# Patient Record
Sex: MALE | Race: WHITE | NOT HISPANIC OR LATINO | URBAN - METROPOLITAN AREA
[De-identification: names, ages, dates, MRNs, and addresses within clinical notes are randomized per-mention and may not be internally consistent; named-entity substitution may affect disease eponyms.]

---

## 2020-07-29 PROBLEM — Z00.00 ENCOUNTER FOR PREVENTIVE HEALTH EXAMINATION: Status: ACTIVE | Noted: 2020-07-29

## 2020-08-05 ENCOUNTER — OUTPATIENT (OUTPATIENT)
Dept: OUTPATIENT SERVICES | Facility: HOSPITAL | Age: 55
LOS: 1 days | End: 2020-08-05
Payer: COMMERCIAL

## 2020-08-05 VITALS
TEMPERATURE: 99 F | OXYGEN SATURATION: 99 % | DIASTOLIC BLOOD PRESSURE: 88 MMHG | HEIGHT: 66 IN | RESPIRATION RATE: 20 BRPM | SYSTOLIC BLOOD PRESSURE: 160 MMHG | WEIGHT: 266.1 LBS | HEART RATE: 88 BPM

## 2020-08-05 DIAGNOSIS — D32.1 BENIGN NEOPLASM OF SPINAL MENINGES: ICD-10-CM

## 2020-08-05 DIAGNOSIS — Z29.9 ENCOUNTER FOR PROPHYLACTIC MEASURES, UNSPECIFIED: ICD-10-CM

## 2020-08-05 DIAGNOSIS — Z86.69 PERSONAL HISTORY OF OTHER DISEASES OF THE NERVOUS SYSTEM AND SENSE ORGANS: ICD-10-CM

## 2020-08-05 DIAGNOSIS — Z98.890 OTHER SPECIFIED POSTPROCEDURAL STATES: Chronic | ICD-10-CM

## 2020-08-05 DIAGNOSIS — E11.9 TYPE 2 DIABETES MELLITUS WITHOUT COMPLICATIONS: ICD-10-CM

## 2020-08-05 DIAGNOSIS — K60.4 RECTAL FISTULA: Chronic | ICD-10-CM

## 2020-08-05 DIAGNOSIS — Z90.89 ACQUIRED ABSENCE OF OTHER ORGANS: Chronic | ICD-10-CM

## 2020-08-05 LAB
A1C WITH ESTIMATED AVERAGE GLUCOSE RESULT: 8.2 % — HIGH (ref 4–5.6)
ALBUMIN SERPL ELPH-MCNC: 4.2 G/DL — SIGNIFICANT CHANGE UP (ref 3.3–5)
ALP SERPL-CCNC: 59 U/L — SIGNIFICANT CHANGE UP (ref 40–120)
ALT FLD-CCNC: 14 U/L — SIGNIFICANT CHANGE UP (ref 10–45)
ANION GAP SERPL CALC-SCNC: 17 MMOL/L — SIGNIFICANT CHANGE UP (ref 5–17)
AST SERPL-CCNC: 13 U/L — SIGNIFICANT CHANGE UP (ref 10–40)
BILIRUB SERPL-MCNC: 0.2 MG/DL — SIGNIFICANT CHANGE UP (ref 0.2–1.2)
BLD GP AB SCN SERPL QL: NEGATIVE — SIGNIFICANT CHANGE UP
BUN SERPL-MCNC: 11 MG/DL — SIGNIFICANT CHANGE UP (ref 7–23)
CALCIUM SERPL-MCNC: 9.8 MG/DL — SIGNIFICANT CHANGE UP (ref 8.4–10.5)
CHLORIDE SERPL-SCNC: 104 MMOL/L — SIGNIFICANT CHANGE UP (ref 96–108)
CO2 SERPL-SCNC: 20 MMOL/L — LOW (ref 22–31)
CREAT SERPL-MCNC: 0.59 MG/DL — SIGNIFICANT CHANGE UP (ref 0.5–1.3)
ESTIMATED AVERAGE GLUCOSE: 189 MG/DL — HIGH (ref 68–114)
GLUCOSE SERPL-MCNC: 217 MG/DL — HIGH (ref 70–99)
HCT VFR BLD CALC: 41.8 % — SIGNIFICANT CHANGE UP (ref 39–50)
HGB BLD-MCNC: 14 G/DL — SIGNIFICANT CHANGE UP (ref 13–17)
MCHC RBC-ENTMCNC: 30.4 PG — SIGNIFICANT CHANGE UP (ref 27–34)
MCHC RBC-ENTMCNC: 33.5 GM/DL — SIGNIFICANT CHANGE UP (ref 32–36)
MCV RBC AUTO: 90.9 FL — SIGNIFICANT CHANGE UP (ref 80–100)
NRBC # BLD: 0 /100 WBCS — SIGNIFICANT CHANGE UP (ref 0–0)
PLATELET # BLD AUTO: 220 K/UL — SIGNIFICANT CHANGE UP (ref 150–400)
POTASSIUM SERPL-MCNC: 3.8 MMOL/L — SIGNIFICANT CHANGE UP (ref 3.5–5.3)
POTASSIUM SERPL-SCNC: 3.8 MMOL/L — SIGNIFICANT CHANGE UP (ref 3.5–5.3)
PROT SERPL-MCNC: 6.8 G/DL — SIGNIFICANT CHANGE UP (ref 6–8.3)
RBC # BLD: 4.6 M/UL — SIGNIFICANT CHANGE UP (ref 4.2–5.8)
RBC # FLD: 12.3 % — SIGNIFICANT CHANGE UP (ref 10.3–14.5)
RH IG SCN BLD-IMP: POSITIVE — SIGNIFICANT CHANGE UP
SODIUM SERPL-SCNC: 141 MMOL/L — SIGNIFICANT CHANGE UP (ref 135–145)
WBC # BLD: 9.28 K/UL — SIGNIFICANT CHANGE UP (ref 3.8–10.5)
WBC # FLD AUTO: 9.28 K/UL — SIGNIFICANT CHANGE UP (ref 3.8–10.5)

## 2020-08-05 PROCEDURE — 80053 COMPREHEN METABOLIC PANEL: CPT

## 2020-08-05 PROCEDURE — 87641 MR-STAPH DNA AMP PROBE: CPT

## 2020-08-05 PROCEDURE — 83036 HEMOGLOBIN GLYCOSYLATED A1C: CPT

## 2020-08-05 PROCEDURE — 85027 COMPLETE CBC AUTOMATED: CPT

## 2020-08-05 PROCEDURE — 86900 BLOOD TYPING SEROLOGIC ABO: CPT

## 2020-08-05 PROCEDURE — 87640 STAPH A DNA AMP PROBE: CPT

## 2020-08-05 PROCEDURE — G0463: CPT

## 2020-08-05 PROCEDURE — 86850 RBC ANTIBODY SCREEN: CPT

## 2020-08-05 PROCEDURE — 86901 BLOOD TYPING SEROLOGIC RH(D): CPT

## 2020-08-05 RX ORDER — CEFAZOLIN SODIUM 1 G
2000 VIAL (EA) INJECTION ONCE
Refills: 0 | Status: DISCONTINUED | OUTPATIENT
Start: 2020-08-17 | End: 2020-08-18

## 2020-08-05 NOTE — H&P PST ADULT - NSICDXPROBLEM_GEN_ALL_CORE_FT
PROBLEM DIAGNOSES  Problem: H/O benign neoplasm of spinal meninges  Assessment and Plan: scheduled for T1 laminectomy/ excision of spinal tumor  preop instruction given, patient not to eat or drink anything after 11pm night prior to surgery, patient verbalized understanding  chlorhexidine wash instruction given  medical evaluation report to obtain from pcp office     Problem: T2DM (type 2 diabetes mellitus)  Assessment and Plan: monitor blood sugar   no diabetic medication morning of surgery   last dose of metformin on 8/15/2020 in the evening     Problem: Need for prophylactic measure  Assessment and Plan: The Caprini score indicates this patient is at risk for a VTE event (score 3-5).  Most surgical patients in this group would benefit from pharmacologic prophylaxis.  The surgical team will determine the balance between VTE risk and bleeding risk

## 2020-08-05 NOTE — H&P PST ADULT - ASSESSMENT
CAPRINI SCORE [CLOT updated 18]    AGE RELATED RISK FACTORS                                                       MOBILITY RELATED FACTORS  [1] Age 41-60 years                                            (1 Point)                    [ ] Bed rest                                                        (1 Point)  [ ] Age: 61-74 years                                           (2 Points)                  [ ] Plaster cast                                                   (2 Points)  [ ] Age= 75 years                                              (3 Points)                    [ ] Bed bound for more than 72 hours                 (2 Points)    DISEASE RELATED RISK FACTORS                                               GENDER SPECIFIC FACTORS  [ ] Edema in the lower extremities                       (1 Point)              [ ] Pregnancy                                                     (1 Point)  [ ] Varicose veins                                               (1 Point)                     [ ] Post-partum < 6 weeks                                   (1 Point)             [ 1] BMI > 25 Kg/m2                                            (1 Point)                     [ ] Hormonal therapy  or oral contraception          (1 Point)                 [ ] Sepsis (in the previous month)                        (1 Point)               [ ] History of pregnancy complications                 (1 point)  [ ] Pneumonia or serious lung disease                                               [ ] Unexplained or recurrent                     (1 Point)           (in the previous month)                               (1 Point)  [ ] Abnormal pulmonary function test                     (1 Point)                 SURGERY RELATED RISK FACTORS  [ ] Acute myocardial infarction                              (1 Point)               [ ]  Section                                             (1 Point)  [ ] Congestive heart failure (in the previous month)  (1 Point)      [ ] Minor surgery                                                  (1 Point)   [ ] Inflammatory bowel disease                             (1 Point)               [ ] Arthroscopic surgery                                        (2 Points)  [ ] Central venous access                                      (2 Points)                [ 2] General surgery lasting more than 45 minutes (2 points)  [ ] Present or previous malignancy                     (2 Points)                [ ] Elective arthroplasty                                         (5 points)    [ ] Stroke (in the previous month)                          (5 Points)                                                                                                                                                           HEMATOLOGY RELATED FACTORS                                                 TRAUMA RELATED RISK FACTORS  [ ] Prior episodes of VTE                                     (3 Points)                [ ] Fracture of the hip, pelvis, or leg                       (5 Points)  [ ] Positive family history for VTE                         (3 Points)             [ ] Acute spinal cord injury (in the previous month)  (5 Points)  [ ] Prothrombin 90831 A                                     (3 Points)               [ ] Paralysis  (less than 1 month)                             (5 Points)  [ ] Factor V Leiden                                             (3 Points)                  [ ] Multiple Trauma within 1 month                        (5 Points)  [ ] Lupus anticoagulants                                     (3 Points)                                                           [ ] Anticardiolipin antibodies                               (3 Points)                                                       [ ] High homocysteine in the blood                      (3 Points)                                             [ ] Other congenital or acquired thrombophilia      (3 Points)                                                [ ] Heparin induced thrombocytopenia                  (3 Points)                                     Total Score [ 4 ]

## 2020-08-05 NOTE — H&P PST ADULT - HISTORY OF PRESENT ILLNESS
55 year old male with a benign neoplasm of spinal meninges found incidentally in June 2020, being seen in preadmission testing for scheduled T1 laminectomy, excision of spinal on 8/17/2020. 55 year old male with a benign neoplasm of spinal meninges found incidentally in June 2020 for a posterior neck fatty tissue MRI, being seen in preadmission testing for scheduled T1 laminectomy, excision of spinal on 8/17/2020. Patient medical history includes T2DM, dyslipidemia, obesity, diabetic neuropathy, lumbar spine pain with bilateral sciatica, T10-11 flattening discs, T4-10 fused discs.    ****COVID-19 PCR TEST SCHEDULED FOR 8/14/2020 [facility] 55 year old male with a benign neoplasm of spinal meninges found incidentally in June 2020 during a posterior neck fatty tissue MRI, being seen in preadmission testing for scheduled T1 laminectomy, excision of spinal on 8/17/2020. Patient medical history includes T2DM, dyslipidemia, obesity, diabetic neuropathy, lumbar spine pain with bilateral sciatica, T10-11 flattening discs, T4-10 fused discs.    ****COVID-19 PCR TEST SCHEDULED FOR 8/14/2020 [facility]

## 2020-08-05 NOTE — H&P PST ADULT - NSICDXPASTSURGICALHX_GEN_ALL_CORE_FT
PAST SURGICAL HISTORY:  History of endoscopy h/o upper and lower 2016    History of tonsillectomy PAST SURGICAL HISTORY:  History of endoscopy h/o upper and lower 2016    History of tonsillectomy     Rectal fistula h/o rectal fistula repair

## 2020-08-05 NOTE — H&P PST ADULT - NSICDXPASTMEDICALHX_GEN_ALL_CORE_FT
PAST MEDICAL HISTORY:  Fatty liver last abdominal sono 6/2020    History of diverticulosis     History of Helicobacter infection 2016 treated    History of migraine headaches     IBS (irritable bowel syndrome)     Pain in back with sciatica bilateral    Pinched nerve in neck mostly left side    Snores     T2DM (type 2 diabetes mellitus) dx 2-3 years ago   unknown last A1c PAST MEDICAL HISTORY:  Dyslipidemia     Fatty liver last abdominal sono 6/2020    H/O benign neoplasm of spinal meninges     H/O diabetic neuropathy     History of diverticulosis     History of Helicobacter infection 2016 treated    History of migraine headaches     History of obesity     IBS (irritable bowel syndrome) mostly loose stool    Pain in back with sciatica bilateral  thoracic spine    Pinched nerve in neck mostly left side    Snores     T2DM (type 2 diabetes mellitus) dx 2-3 years ago   unknown last A1c PAST MEDICAL HISTORY:  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

## 2020-08-06 LAB
MRSA PCR RESULT.: SIGNIFICANT CHANGE UP
S AUREUS DNA NOSE QL NAA+PROBE: DETECTED

## 2020-08-14 ENCOUNTER — APPOINTMENT (OUTPATIENT)
Dept: DISASTER EMERGENCY | Facility: CLINIC | Age: 55
End: 2020-08-14

## 2020-08-14 DIAGNOSIS — Z01.818 ENCOUNTER FOR OTHER PREPROCEDURAL EXAMINATION: ICD-10-CM

## 2020-08-15 LAB — SARS-COV-2 N GENE NPH QL NAA+PROBE: NOT DETECTED

## 2020-08-16 ENCOUNTER — TRANSCRIPTION ENCOUNTER (OUTPATIENT)
Age: 55
End: 2020-08-16

## 2020-08-17 ENCOUNTER — RESULT REVIEW (OUTPATIENT)
Age: 55
End: 2020-08-17

## 2020-08-17 ENCOUNTER — INPATIENT (INPATIENT)
Facility: HOSPITAL | Age: 55
LOS: 3 days | Discharge: HOME | DRG: 29 | End: 2020-08-21
Attending: NEUROLOGICAL SURGERY | Admitting: NEUROLOGICAL SURGERY
Payer: COMMERCIAL

## 2020-08-17 VITALS
OXYGEN SATURATION: 97 % | RESPIRATION RATE: 18 BRPM | TEMPERATURE: 98 F | DIASTOLIC BLOOD PRESSURE: 89 MMHG | SYSTOLIC BLOOD PRESSURE: 157 MMHG | WEIGHT: 266.1 LBS | HEART RATE: 89 BPM | HEIGHT: 66 IN

## 2020-08-17 DIAGNOSIS — Z98.890 OTHER SPECIFIED POSTPROCEDURAL STATES: Chronic | ICD-10-CM

## 2020-08-17 DIAGNOSIS — D32.1 BENIGN NEOPLASM OF SPINAL MENINGES: ICD-10-CM

## 2020-08-17 DIAGNOSIS — K60.4 RECTAL FISTULA: Chronic | ICD-10-CM

## 2020-08-17 DIAGNOSIS — Z90.89 ACQUIRED ABSENCE OF OTHER ORGANS: Chronic | ICD-10-CM

## 2020-08-17 LAB
ANION GAP SERPL CALC-SCNC: 15 MMOL/L — SIGNIFICANT CHANGE UP (ref 5–17)
BUN SERPL-MCNC: 12 MG/DL — SIGNIFICANT CHANGE UP (ref 7–23)
CALCIUM SERPL-MCNC: 8.7 MG/DL — SIGNIFICANT CHANGE UP (ref 8.4–10.5)
CHLORIDE SERPL-SCNC: 101 MMOL/L — SIGNIFICANT CHANGE UP (ref 96–108)
CO2 SERPL-SCNC: 21 MMOL/L — LOW (ref 22–31)
CREAT SERPL-MCNC: 0.92 MG/DL — SIGNIFICANT CHANGE UP (ref 0.5–1.3)
GLUCOSE BLDC GLUCOMTR-MCNC: 180 MG/DL — HIGH (ref 70–99)
GLUCOSE BLDC GLUCOMTR-MCNC: 256 MG/DL — HIGH (ref 70–99)
GLUCOSE SERPL-MCNC: 281 MG/DL — HIGH (ref 70–99)
HCT VFR BLD CALC: 39.6 % — SIGNIFICANT CHANGE UP (ref 39–50)
HGB BLD-MCNC: 13.1 G/DL — SIGNIFICANT CHANGE UP (ref 13–17)
MCHC RBC-ENTMCNC: 30.4 PG — SIGNIFICANT CHANGE UP (ref 27–34)
MCHC RBC-ENTMCNC: 33.1 GM/DL — SIGNIFICANT CHANGE UP (ref 32–36)
MCV RBC AUTO: 91.9 FL — SIGNIFICANT CHANGE UP (ref 80–100)
NRBC # BLD: 0 /100 WBCS — SIGNIFICANT CHANGE UP (ref 0–0)
PLATELET # BLD AUTO: 212 K/UL — SIGNIFICANT CHANGE UP (ref 150–400)
POTASSIUM SERPL-MCNC: 4.3 MMOL/L — SIGNIFICANT CHANGE UP (ref 3.5–5.3)
POTASSIUM SERPL-SCNC: 4.3 MMOL/L — SIGNIFICANT CHANGE UP (ref 3.5–5.3)
RBC # BLD: 4.31 M/UL — SIGNIFICANT CHANGE UP (ref 4.2–5.8)
RBC # FLD: 12.8 % — SIGNIFICANT CHANGE UP (ref 10.3–14.5)
RH IG SCN BLD-IMP: POSITIVE — SIGNIFICANT CHANGE UP
SODIUM SERPL-SCNC: 137 MMOL/L — SIGNIFICANT CHANGE UP (ref 135–145)
WBC # BLD: 9.89 K/UL — SIGNIFICANT CHANGE UP (ref 3.8–10.5)
WBC # FLD AUTO: 9.89 K/UL — SIGNIFICANT CHANGE UP (ref 3.8–10.5)

## 2020-08-17 PROCEDURE — 88331 PATH CONSLTJ SURG 1 BLK 1SPC: CPT | Mod: 26

## 2020-08-17 PROCEDURE — 88307 TISSUE EXAM BY PATHOLOGIST: CPT | Mod: 26

## 2020-08-17 PROCEDURE — 88342 IMHCHEM/IMCYTCHM 1ST ANTB: CPT | Mod: 26,59

## 2020-08-17 PROCEDURE — 88305 TISSUE EXAM BY PATHOLOGIST: CPT | Mod: 26

## 2020-08-17 PROCEDURE — 99291 CRITICAL CARE FIRST HOUR: CPT

## 2020-08-17 PROCEDURE — 88360 TUMOR IMMUNOHISTOCHEM/MANUAL: CPT | Mod: 26

## 2020-08-17 PROCEDURE — 88334 PATH CONSLTJ SURG CYTO XM EA: CPT | Mod: 26,59

## 2020-08-17 RX ORDER — DEXAMETHASONE 0.5 MG/5ML
4 ELIXIR ORAL EVERY 8 HOURS
Refills: 0 | Status: DISCONTINUED | OUTPATIENT
Start: 2020-08-17 | End: 2020-08-21

## 2020-08-17 RX ORDER — LABETALOL HCL 100 MG
10 TABLET ORAL ONCE
Refills: 0 | Status: COMPLETED | OUTPATIENT
Start: 2020-08-17 | End: 2020-08-17

## 2020-08-17 RX ORDER — INSULIN LISPRO 100/ML
VIAL (ML) SUBCUTANEOUS
Refills: 0 | Status: DISCONTINUED | OUTPATIENT
Start: 2020-08-17 | End: 2020-08-19

## 2020-08-17 RX ORDER — OXYCODONE HYDROCHLORIDE 5 MG/1
10 TABLET ORAL EVERY 6 HOURS
Refills: 0 | Status: DISCONTINUED | OUTPATIENT
Start: 2020-08-17 | End: 2020-08-21

## 2020-08-17 RX ORDER — SODIUM CHLORIDE 9 MG/ML
1000 INJECTION, SOLUTION INTRAVENOUS
Refills: 0 | Status: DISCONTINUED | OUTPATIENT
Start: 2020-08-17 | End: 2020-08-21

## 2020-08-17 RX ORDER — ACETAMINOPHEN 500 MG
650 TABLET ORAL EVERY 6 HOURS
Refills: 0 | Status: DISCONTINUED | OUTPATIENT
Start: 2020-08-17 | End: 2020-08-21

## 2020-08-17 RX ORDER — LISINOPRIL 2.5 MG/1
5 TABLET ORAL DAILY
Refills: 0 | Status: DISCONTINUED | OUTPATIENT
Start: 2020-08-17 | End: 2020-08-21

## 2020-08-17 RX ORDER — OXYCODONE HYDROCHLORIDE 5 MG/1
5 TABLET ORAL EVERY 6 HOURS
Refills: 0 | Status: DISCONTINUED | OUTPATIENT
Start: 2020-08-17 | End: 2020-08-21

## 2020-08-17 RX ORDER — DEXTROSE 50 % IN WATER 50 %
25 SYRINGE (ML) INTRAVENOUS ONCE
Refills: 0 | Status: DISCONTINUED | OUTPATIENT
Start: 2020-08-17 | End: 2020-08-21

## 2020-08-17 RX ORDER — SENNA PLUS 8.6 MG/1
2 TABLET ORAL AT BEDTIME
Refills: 0 | Status: DISCONTINUED | OUTPATIENT
Start: 2020-08-17 | End: 2020-08-21

## 2020-08-17 RX ORDER — UBIDECARENONE 100 MG
0 CAPSULE ORAL
Qty: 0 | Refills: 0 | DISCHARGE

## 2020-08-17 RX ORDER — DEXTROSE 50 % IN WATER 50 %
12.5 SYRINGE (ML) INTRAVENOUS ONCE
Refills: 0 | Status: DISCONTINUED | OUTPATIENT
Start: 2020-08-17 | End: 2020-08-21

## 2020-08-17 RX ORDER — CHOLECALCIFEROL (VITAMIN D3) 125 MCG
0 CAPSULE ORAL
Qty: 0 | Refills: 0 | DISCHARGE

## 2020-08-17 RX ORDER — CLOTRIMAZOLE 10 MG
0 TROCHE MUCOUS MEMBRANE
Qty: 0 | Refills: 0 | DISCHARGE

## 2020-08-17 RX ORDER — CHLORHEXIDINE GLUCONATE 213 G/1000ML
1 SOLUTION TOPICAL ONCE
Refills: 0 | Status: DISCONTINUED | OUTPATIENT
Start: 2020-08-17 | End: 2020-08-17

## 2020-08-17 RX ORDER — SIMVASTATIN 20 MG/1
5 TABLET, FILM COATED ORAL AT BEDTIME
Refills: 0 | Status: DISCONTINUED | OUTPATIENT
Start: 2020-08-17 | End: 2020-08-21

## 2020-08-17 RX ORDER — SODIUM CHLORIDE 9 MG/ML
1000 INJECTION INTRAMUSCULAR; INTRAVENOUS; SUBCUTANEOUS
Refills: 0 | Status: DISCONTINUED | OUTPATIENT
Start: 2020-08-17 | End: 2020-08-18

## 2020-08-17 RX ORDER — ONDANSETRON 8 MG/1
4 TABLET, FILM COATED ORAL EVERY 6 HOURS
Refills: 0 | Status: DISCONTINUED | OUTPATIENT
Start: 2020-08-17 | End: 2020-08-17

## 2020-08-17 RX ORDER — HYDROMORPHONE HYDROCHLORIDE 2 MG/ML
0.25 INJECTION INTRAMUSCULAR; INTRAVENOUS; SUBCUTANEOUS
Refills: 0 | Status: DISCONTINUED | OUTPATIENT
Start: 2020-08-17 | End: 2020-08-17

## 2020-08-17 RX ORDER — METFORMIN HYDROCHLORIDE 850 MG/1
1 TABLET ORAL
Qty: 0 | Refills: 0 | DISCHARGE

## 2020-08-17 RX ORDER — SIMVASTATIN 20 MG/1
1 TABLET, FILM COATED ORAL
Qty: 0 | Refills: 0 | DISCHARGE

## 2020-08-17 RX ORDER — GLUCAGON INJECTION, SOLUTION 0.5 MG/.1ML
1 INJECTION, SOLUTION SUBCUTANEOUS ONCE
Refills: 0 | Status: DISCONTINUED | OUTPATIENT
Start: 2020-08-17 | End: 2020-08-21

## 2020-08-17 RX ORDER — ZINC SULFATE TAB 220 MG (50 MG ZINC EQUIVALENT) 220 (50 ZN) MG
0 TAB ORAL
Qty: 0 | Refills: 0 | DISCHARGE

## 2020-08-17 RX ORDER — INSULIN LISPRO 100/ML
VIAL (ML) SUBCUTANEOUS
Refills: 0 | Status: DISCONTINUED | OUTPATIENT
Start: 2020-08-17 | End: 2020-08-17

## 2020-08-17 RX ORDER — DEXTROSE 50 % IN WATER 50 %
15 SYRINGE (ML) INTRAVENOUS ONCE
Refills: 0 | Status: DISCONTINUED | OUTPATIENT
Start: 2020-08-17 | End: 2020-08-21

## 2020-08-17 RX ORDER — SODIUM CHLORIDE 9 MG/ML
3 INJECTION INTRAMUSCULAR; INTRAVENOUS; SUBCUTANEOUS EVERY 8 HOURS
Refills: 0 | Status: DISCONTINUED | OUTPATIENT
Start: 2020-08-17 | End: 2020-08-17

## 2020-08-17 RX ORDER — LIDOCAINE HCL 20 MG/ML
0.2 VIAL (ML) INJECTION ONCE
Refills: 0 | Status: DISCONTINUED | OUTPATIENT
Start: 2020-08-17 | End: 2020-08-17

## 2020-08-17 RX ORDER — RAMIPRIL 5 MG
0 CAPSULE ORAL
Qty: 0 | Refills: 0 | DISCHARGE

## 2020-08-17 RX ADMIN — Medication 4 MILLIGRAM(S): at 21:08

## 2020-08-17 RX ADMIN — Medication 3: at 16:09

## 2020-08-17 RX ADMIN — Medication 10 MILLIGRAM(S): at 21:08

## 2020-08-17 RX ADMIN — OXYCODONE HYDROCHLORIDE 10 MILLIGRAM(S): 5 TABLET ORAL at 20:05

## 2020-08-17 RX ADMIN — OXYCODONE HYDROCHLORIDE 10 MILLIGRAM(S): 5 TABLET ORAL at 20:45

## 2020-08-17 RX ADMIN — Medication 10 MILLIGRAM(S): at 15:34

## 2020-08-17 RX ADMIN — Medication 10 MILLIGRAM(S): at 20:39

## 2020-08-17 RX ADMIN — SIMVASTATIN 5 MILLIGRAM(S): 20 TABLET, FILM COATED ORAL at 21:09

## 2020-08-17 NOTE — PROGRESS NOTE ADULT - ASSESSMENT
ASSESSMENT/PLAN: S/P T1 laminectomy for resection of intradural tumor    NEURO:  Q1 neurochecks  HOB >30 degrees  Monitor drain output  Decadron 4Q8 per NSGY  Analgesics prn  Activity: [] mobilize as tolerated [x] Bedrest for now [] PT [] OT [] PMNR    PULM:  Extubated  Keep sats >92%    CV:  SBP goal <160  Continue home antihypertensives (lisinopril)  On statin    RENAL:  Fluids: IVL once tolerating  Cr wnl  Replete lytes prn    GI:  Diet: Dysphagia eval and advance as tolerated  GI prophylaxis [] not indicated [] PPI [] other:  Bowel regimen [] colace [x] senna [] other:    ENDO: H/O DM  Goal euglycemia (-180)  A1c  Continue ISS    HEME/ONC:  VTE prophylaxis: [x] SCDs [] chemoprophylaxis [x] hold chemoprophylaxis due to: fresh post op[] high risk of DVT/PE on admission due to:  EBL 200cc  H/H post op stable    ID:  Perioperative antibiotics (ancef)  COVID neg  MSSA nares postive    MISC:    SOCIAL/FAMILY:  [x] awaiting [] updated at bedside [] family meeting    CODE STATUS:  [x] Full Code [] DNR [] DNI [] Palliative/Comfort Care    DISPOSITION:  [x] ICU [] Stroke Unit [] Floor [] EMU [] RCU [] PCU    [x] Patient is at high risk of neurologic deterioration/death due to: paraplegia, shock, hemorrhage.       Time spent: 40 critical care minutes    Contact: [phone] ASSESSMENT/PLAN: S/P T1 laminectomy for resection of intradural tumor    NEURO:  Q1 neurochecks  HOB >30 degrees  Monitor drain output (HMV)  Decadron 4Q8 per NSGY  MRI in AM  Analgesics prn  F/U pathology  Activity: [] mobilize as tolerated [x] Bedrest for now [] PT [] OT [] PMNR    PULM:  Extubated  Keep sats >92%    CV:  SBP goal <160  Continue home antihypertensives (lisinopril)  On statin    RENAL:  Fluids: IVL once tolerating  Cr wnl  Replete lytes prn    GI:  Diet: Dysphagia eval and advance as tolerated  GI prophylaxis [] not indicated [] PPI [] other:  Bowel regimen [] colace [x] senna [] other:    ENDO: H/O DM  Goal euglycemia (-180)  A1c  Continue ISS    HEME/ONC:  VTE prophylaxis: [x] SCDs [] chemoprophylaxis [x] hold chemoprophylaxis due to: fresh post op[] high risk of DVT/PE on admission due to:  EBL 200cc  H/H post op stable    ID:  Perioperative antibiotics (ancef)  COVID neg  MSSA nares postive    MISC:    SOCIAL/FAMILY:  [x] awaiting [] updated at bedside [] family meeting    CODE STATUS:  [x] Full Code [] DNR [] DNI [] Palliative/Comfort Care    DISPOSITION:  [x] ICU [] Stroke Unit [] Floor [] EMU [] RCU [] PCU    [x] Patient is at high risk of neurologic deterioration/death due to: paraplegia, shock, hemorrhage.       Time spent: 40 critical care minutes    Contact: [phone]

## 2020-08-17 NOTE — CHART NOTE - NSCHARTNOTEFT_GEN_A_CORE
CAPRINI SCORE [CLOT] Score on Admission for     AGE RELATED RISK FACTORS                                                       MOBILITY RELATED FACTORS  [x ] Age 41-60 years                                            (1 Point)                  [ ] Bed rest                                                        (1 Point)  [ ] Age: 61-74 years                                           (2 Points)                 [ ] Plaster cast                                                   (2 Points)  [ ] Age= 75 years                                              (3 Points)                 [ ] Bed bound for more than 72 hours                 (2 Points)    DISEASE RELATED RISK FACTORS                                               GENDER SPECIFIC FACTORS  [ ] Edema in the lower extremities                       (1 Point)                  [ ] Pregnancy                                                     (1 Point)  [ ] Varicose veins                                               (1 Point)                  [ ] Post-partum < 6 weeks                                   (1 Point)             [x ] BMI > 25 Kg/m2                                            (1 Point)                  [ ] Hormonal therapy  or oral contraception          (1 Point)                 [ ] Sepsis (in the previous month)                        (1 Point)                  [ ] History of pregnancy complications                 (1 point)  [ ] Pneumonia or serious lung disease                                               [ ] Unexplained or recurrent                     (1 Point)           (in the previous month)                               (1 Point)  [ ] Abnormal pulmonary function test                     (1 Point)                 SURGERY RELATED RISK FACTORS (include planned surgeries)  [ ] Acute myocardial infarction                              (1 Point)                 [ ]  Section                                             (1 Point)  [ ] Congestive heart failure (in the previous month)  (1 Point)         [ ] Minor surgery                                                  (1 Point)   [ ] Inflammatory bowel disease                             (1 Point)                 [ ] Arthroscopic surgery                                        (2 Points)  [ ] Central venous access                                      (2 Points)                [x ] General surgery lasting more than 45 minutes   (2 Points)       [ ] Stroke (in the previous month)                          (5 Points)               [ ] Elective arthroplasty                                         (5 Points)            [ ] current or past malignancy                              (2 Points)                                                                                                       HEMATOLOGY RELATED FACTORS                                                 TRAUMA RELATED RISK FACTORS  [ ] Prior episodes of VTE                                     (3 Points)                [ ] Fracture of the hip, pelvis, or leg                       (5 Points)  [ ] Positive family history for VTE                         (3 Points)                 [ ] Acute spinal cord injury (in the previous month)  (5 Points)  [ ] Prothrombin 03217 A                                     (3 Points)                 [ ] Paralysis  (less than 1 month)                             (5 Points)  [ ] Factor V Leiden                                             (3 Points)                  [ ] Multiple Trauma within 1 month                        (5 Points)  [ ] Lupus anticoagulants                                     (3 Points)                                                           [ ] Anticardiolipin antibodies                               (3 Points)                                                       [ ] High homocysteine in the blood                      (3 Points)                                             [ ] Other congenital or acquired thrombophilia      (3 Points)                                                [ ] Heparin induced thrombocytopenia                  (3 Points)                                          Total Score [     4     ]    Risk:  Very low 0   Low 1 to 2   Moderate 3 to 4   High =5       VTE Prophylasix Recommednations:  [x ] mechanical pneumatic compression devices                                      [ ] contraindicated: _____________________  [ ] chemo prophylasix                                                                                   [ x] contraindicated __bleeding risk___________________    **** HIGH LIKELIHOOD DVT PRESENT ON ADMISSION  [ ] (please order LE dopplers within 24 hours of admission)

## 2020-08-17 NOTE — BRIEF OPERATIVE NOTE - NSICDXBRIEFPROCEDURE_GEN_ALL_CORE_FT
PROCEDURES:  Laminectomy, lumbar, for tumor 17-Aug-2020 13:56:09  Dino Swartz  Thoracic laminoplasty 17-Aug-2020 13:55:02  Dino Swartz HEADACHE/dizziness

## 2020-08-17 NOTE — PROGRESS NOTE ADULT - SUBJECTIVE AND OBJECTIVE BOX
HPI:  55 year old male with a benign neoplasm of spinal meninges found incidentally in June 2020 during a posterior neck fatty tissue MRI. Scheduled T1 laminectomy, excision of spinal on 8/17/2020. Patient medical history includes T2DM, dyslipidemia, obesity, diabetic neuropathy, lumbar spine pain with bilateral sciatica, T10-11 flattening discs, T4-10 fused discs.      HOSP COURSE:   8/17: T1 laminectomy for resection of intradural tumor      On admission, the patient was:  GCS: 15  Hunt-Arellano:  modified Bush:  ICH score:  NIHSS:    *** HIGH RISK OF DVT PRESENT ON ADMISSION ***    ICU Vital Signs Last 24 Hrs  T(C): 36.2 (17 Aug 2020 14:10), Max: 36.9 (17 Aug 2020 06:19)  T(F): 97.2 (17 Aug 2020 14:10), Max: 98.4 (17 Aug 2020 06:19)  HR: 88 (17 Aug 2020 15:30) (86 - 92)  BP: 163/89 (17 Aug 2020 15:00) (123/81 - 164/96)  BP(mean): 120 (17 Aug 2020 15:00) (97 - 123)  ABP: 166/66 (17 Aug 2020 15:30) (156/72 - 172/86)  ABP(mean): 96 (17 Aug 2020 15:30) (96 - 118)  RR: 18 (17 Aug 2020 15:30) (16 - 19)  SpO2: 98% (17 Aug 2020 15:30) (97% - 100%)      08-17-20 @ 07:01  -  08-17-20 @ 15:49  --------------------------------------------------------  IN: 75 mL / OUT: 355 mL / NET: -280 mL      acetaminophen   Tablet .. 650 milliGRAM(s) Oral every 6 hours PRN  bisacodyl 5 milliGRAM(s) Oral daily PRN  ceFAZolin   IVPB 2000 milliGRAM(s) IV Intermittent once  dexAMETHasone  Injectable 4 milliGRAM(s) IV Push every 8 hours  dextrose 40% Gel 15 Gram(s) Oral once PRN  dextrose 5%. 1000 milliLiter(s) (50 mL/Hr) IV Continuous <Continuous>  dextrose 50% Injectable 12.5 Gram(s) IV Push once  dextrose 50% Injectable 25 Gram(s) IV Push once  dextrose 50% Injectable 25 Gram(s) IV Push once  glucagon  Injectable 1 milliGRAM(s) IntraMuscular once PRN  HYDROmorphone  Injectable 0.25 milliGRAM(s) IV Push every 10 minutes PRN  insulin lispro (HumaLOG) corrective regimen sliding scale   SubCutaneous three times a day before meals  lisinopril 5 milliGRAM(s) Oral daily  ondansetron Injectable 4 milliGRAM(s) IV Push every 6 hours PRN  oxyCODONE    IR 5 milliGRAM(s) Oral every 6 hours PRN  oxyCODONE    IR 10 milliGRAM(s) Oral every 6 hours PRN  senna 2 Tablet(s) Oral at bedtime PRN  simvastatin 5 milliGRAM(s) Oral at bedtime  sodium chloride 0.9%. 1000 milliLiter(s) (75 mL/Hr) IV Continuous <Continuous>                          13.1   9.89  )-----------( 212      ( 17 Aug 2020 15:15 )             39.6       EXAMINATION:  General:  calm  HEENT:  MMM  Neuro: Alert, Awake, oriented x 3, sensation intact, power 5/5 in all extremities.   Cards:  RRR  Respiratory:  no respiratory distress  Abdomen:  soft  Extremities:  no edema  Skin:  warm/dry HPI:  55 year old male with a benign neoplasm of spinal meninges found incidentally in June 2020 during a posterior neck fatty tissue MRI. Scheduled T1 laminectomy, excision of spinal on 8/17/2020. Patient medical history includes T2DM, dyslipidemia, obesity, diabetic neuropathy, lumbar spine pain with bilateral sciatica, T10-11 flattening discs, T4-10 fused discs.      HOSP COURSE:   8/17: T1 laminectomy for resection of intradural tumor      On admission, the patient was:  GCS: 15      *** HIGH RISK OF DVT PRESENT ON ADMISSION ***    ICU Vital Signs Last 24 Hrs  T(C): 36.2 (17 Aug 2020 14:10), Max: 36.9 (17 Aug 2020 06:19)  T(F): 97.2 (17 Aug 2020 14:10), Max: 98.4 (17 Aug 2020 06:19)  HR: 88 (17 Aug 2020 15:30) (86 - 92)  BP: 163/89 (17 Aug 2020 15:00) (123/81 - 164/96)  BP(mean): 120 (17 Aug 2020 15:00) (97 - 123)  ABP: 166/66 (17 Aug 2020 15:30) (156/72 - 172/86)  ABP(mean): 96 (17 Aug 2020 15:30) (96 - 118)  RR: 18 (17 Aug 2020 15:30) (16 - 19)  SpO2: 98% (17 Aug 2020 15:30) (97% - 100%)      08-17-20 @ 07:01  -  08-17-20 @ 15:49  --------------------------------------------------------  IN: 75 mL / OUT: 355 mL / NET: -280 mL      acetaminophen   Tablet .. 650 milliGRAM(s) Oral every 6 hours PRN  bisacodyl 5 milliGRAM(s) Oral daily PRN  ceFAZolin   IVPB 2000 milliGRAM(s) IV Intermittent once  dexAMETHasone  Injectable 4 milliGRAM(s) IV Push every 8 hours  dextrose 40% Gel 15 Gram(s) Oral once PRN  dextrose 5%. 1000 milliLiter(s) (50 mL/Hr) IV Continuous <Continuous>  dextrose 50% Injectable 12.5 Gram(s) IV Push once  dextrose 50% Injectable 25 Gram(s) IV Push once  dextrose 50% Injectable 25 Gram(s) IV Push once  glucagon  Injectable 1 milliGRAM(s) IntraMuscular once PRN  HYDROmorphone  Injectable 0.25 milliGRAM(s) IV Push every 10 minutes PRN  insulin lispro (HumaLOG) corrective regimen sliding scale   SubCutaneous three times a day before meals  lisinopril 5 milliGRAM(s) Oral daily  ondansetron Injectable 4 milliGRAM(s) IV Push every 6 hours PRN  oxyCODONE    IR 5 milliGRAM(s) Oral every 6 hours PRN  oxyCODONE    IR 10 milliGRAM(s) Oral every 6 hours PRN  senna 2 Tablet(s) Oral at bedtime PRN  simvastatin 5 milliGRAM(s) Oral at bedtime  sodium chloride 0.9%. 1000 milliLiter(s) (75 mL/Hr) IV Continuous <Continuous>                          13.1   9.89  )-----------( 212      ( 17 Aug 2020 15:15 )             39.6       EXAMINATION:  General:  calm  HEENT:  MMM  Neuro: Alert, Awake, oriented x 3, sensation intact, power 5/5 in all extremities.   Cards:  RRR  Respiratory:  no respiratory distress  Abdomen:  soft  Extremities:  no edema  Skin:  warm/dry HPI:  55 year old male with a benign neoplasm of spinal meninges found incidentally in June 2020 during a posterior neck fatty tissue MRI. Scheduled T1 laminectomy, excision of spinal on 8/17/2020. Patient medical history includes T2DM, dyslipidemia, obesity, diabetic neuropathy, lumbar spine pain with bilateral sciatica, T10-11 flattening discs, T4-10 fused discs.      HOSP COURSE:   8/17: T1 laminectomy for resection of intradural tumor      On admission, the patient was:  GCS: 15      *** HIGH RISK OF DVT PRESENT ON ADMISSION ***    ICU Vital Signs Last 24 Hrs  T(C): 36.2 (17 Aug 2020 14:10), Max: 36.9 (17 Aug 2020 06:19)  T(F): 97.2 (17 Aug 2020 14:10), Max: 98.4 (17 Aug 2020 06:19)  HR: 88 (17 Aug 2020 15:30) (86 - 92)  BP: 163/89 (17 Aug 2020 15:00) (123/81 - 164/96)  BP(mean): 120 (17 Aug 2020 15:00) (97 - 123)  ABP: 166/66 (17 Aug 2020 15:30) (156/72 - 172/86)  ABP(mean): 96 (17 Aug 2020 15:30) (96 - 118)  RR: 18 (17 Aug 2020 15:30) (16 - 19)  SpO2: 98% (17 Aug 2020 15:30) (97% - 100%)      08-17-20 @ 07:01  -  08-17-20 @ 15:49  --------------------------------------------------------  IN: 75 mL / OUT: 355 mL / NET: -280 mL      acetaminophen   Tablet .. 650 milliGRAM(s) Oral every 6 hours PRN  bisacodyl 5 milliGRAM(s) Oral daily PRN  ceFAZolin   IVPB 2000 milliGRAM(s) IV Intermittent once  dexAMETHasone  Injectable 4 milliGRAM(s) IV Push every 8 hours  dextrose 40% Gel 15 Gram(s) Oral once PRN  dextrose 5%. 1000 milliLiter(s) (50 mL/Hr) IV Continuous <Continuous>  dextrose 50% Injectable 12.5 Gram(s) IV Push once  dextrose 50% Injectable 25 Gram(s) IV Push once  dextrose 50% Injectable 25 Gram(s) IV Push once  glucagon  Injectable 1 milliGRAM(s) IntraMuscular once PRN  HYDROmorphone  Injectable 0.25 milliGRAM(s) IV Push every 10 minutes PRN  insulin lispro (HumaLOG) corrective regimen sliding scale   SubCutaneous three times a day before meals  lisinopril 5 milliGRAM(s) Oral daily  ondansetron Injectable 4 milliGRAM(s) IV Push every 6 hours PRN  oxyCODONE    IR 5 milliGRAM(s) Oral every 6 hours PRN  oxyCODONE    IR 10 milliGRAM(s) Oral every 6 hours PRN  senna 2 Tablet(s) Oral at bedtime PRN  simvastatin 5 milliGRAM(s) Oral at bedtime  sodium chloride 0.9%. 1000 milliLiter(s) (75 mL/Hr) IV Continuous <Continuous>                          13.1   9.89  )-----------( 212      ( 17 Aug 2020 15:15 )             39.6     08-17    137  |  101  |  12  ----------------------------<  281<H>  4.3   |  21<L>  |  0.92    Ca    8.7      17 Aug 2020 15:15          EXAMINATION:  General:  calm  HEENT:  MMM  Neuro: Alert, Awake, oriented x 3, sensation intact, power 5/5 in all extremities.   Cards:  RRR  Respiratory:  no respiratory distress  Abdomen:  soft  Extremities:  no edema  Skin:  warm/dry

## 2020-08-18 LAB
ANION GAP SERPL CALC-SCNC: 9 MMOL/L — SIGNIFICANT CHANGE UP (ref 5–17)
BUN SERPL-MCNC: 8 MG/DL — SIGNIFICANT CHANGE UP (ref 7–23)
CALCIUM SERPL-MCNC: 9 MG/DL — SIGNIFICANT CHANGE UP (ref 8.4–10.5)
CHLORIDE SERPL-SCNC: 108 MMOL/L — SIGNIFICANT CHANGE UP (ref 96–108)
CO2 SERPL-SCNC: 23 MMOL/L — SIGNIFICANT CHANGE UP (ref 22–31)
CREAT SERPL-MCNC: 0.66 MG/DL — SIGNIFICANT CHANGE UP (ref 0.5–1.3)
GLUCOSE BLDC GLUCOMTR-MCNC: 240 MG/DL — HIGH (ref 70–99)
GLUCOSE BLDC GLUCOMTR-MCNC: 240 MG/DL — HIGH (ref 70–99)
GLUCOSE BLDC GLUCOMTR-MCNC: 249 MG/DL — HIGH (ref 70–99)
GLUCOSE BLDC GLUCOMTR-MCNC: 296 MG/DL — HIGH (ref 70–99)
GLUCOSE SERPL-MCNC: 222 MG/DL — HIGH (ref 70–99)
HCT VFR BLD CALC: 37.2 % — LOW (ref 39–50)
HGB BLD-MCNC: 12.5 G/DL — LOW (ref 13–17)
MAGNESIUM SERPL-MCNC: 1.8 MG/DL — SIGNIFICANT CHANGE UP (ref 1.6–2.6)
MCHC RBC-ENTMCNC: 30.5 PG — SIGNIFICANT CHANGE UP (ref 27–34)
MCHC RBC-ENTMCNC: 33.6 GM/DL — SIGNIFICANT CHANGE UP (ref 32–36)
MCV RBC AUTO: 90.7 FL — SIGNIFICANT CHANGE UP (ref 80–100)
NRBC # BLD: 0 /100 WBCS — SIGNIFICANT CHANGE UP (ref 0–0)
PHOSPHATE SERPL-MCNC: 3.2 MG/DL — SIGNIFICANT CHANGE UP (ref 2.5–4.5)
PLATELET # BLD AUTO: 221 K/UL — SIGNIFICANT CHANGE UP (ref 150–400)
POTASSIUM SERPL-MCNC: 3.7 MMOL/L — SIGNIFICANT CHANGE UP (ref 3.5–5.3)
POTASSIUM SERPL-SCNC: 3.7 MMOL/L — SIGNIFICANT CHANGE UP (ref 3.5–5.3)
RBC # BLD: 4.1 M/UL — LOW (ref 4.2–5.8)
RBC # FLD: 13 % — SIGNIFICANT CHANGE UP (ref 10.3–14.5)
SODIUM SERPL-SCNC: 140 MMOL/L — SIGNIFICANT CHANGE UP (ref 135–145)
WBC # BLD: 12.89 K/UL — HIGH (ref 3.8–10.5)
WBC # FLD AUTO: 12.89 K/UL — HIGH (ref 3.8–10.5)

## 2020-08-18 PROCEDURE — 72157 MRI CHEST SPINE W/O & W/DYE: CPT | Mod: 26

## 2020-08-18 RX ORDER — INSULIN LISPRO 100/ML
VIAL (ML) SUBCUTANEOUS AT BEDTIME
Refills: 0 | Status: DISCONTINUED | OUTPATIENT
Start: 2020-08-18 | End: 2020-08-19

## 2020-08-18 RX ORDER — ENOXAPARIN SODIUM 100 MG/ML
40 INJECTION SUBCUTANEOUS
Refills: 0 | Status: DISCONTINUED | OUTPATIENT
Start: 2020-08-18 | End: 2020-08-21

## 2020-08-18 RX ORDER — MAGNESIUM SULFATE 500 MG/ML
1 VIAL (ML) INJECTION ONCE
Refills: 0 | Status: COMPLETED | OUTPATIENT
Start: 2020-08-18 | End: 2020-08-18

## 2020-08-18 RX ORDER — INSULIN LISPRO 100/ML
3 VIAL (ML) SUBCUTANEOUS
Refills: 0 | Status: DISCONTINUED | OUTPATIENT
Start: 2020-08-18 | End: 2020-08-19

## 2020-08-18 RX ORDER — POTASSIUM CHLORIDE 20 MEQ
40 PACKET (EA) ORAL ONCE
Refills: 0 | Status: COMPLETED | OUTPATIENT
Start: 2020-08-18 | End: 2020-08-18

## 2020-08-18 RX ORDER — POLYETHYLENE GLYCOL 3350 17 G/17G
17 POWDER, FOR SOLUTION ORAL
Refills: 0 | Status: DISCONTINUED | OUTPATIENT
Start: 2020-08-18 | End: 2020-08-21

## 2020-08-18 RX ADMIN — OXYCODONE HYDROCHLORIDE 10 MILLIGRAM(S): 5 TABLET ORAL at 14:00

## 2020-08-18 RX ADMIN — Medication 6: at 17:51

## 2020-08-18 RX ADMIN — SODIUM CHLORIDE 75 MILLILITER(S): 9 INJECTION INTRAMUSCULAR; INTRAVENOUS; SUBCUTANEOUS at 05:28

## 2020-08-18 RX ADMIN — Medication 4: at 09:28

## 2020-08-18 RX ADMIN — Medication 100 GRAM(S): at 01:05

## 2020-08-18 RX ADMIN — Medication 3 UNIT(S): at 17:52

## 2020-08-18 RX ADMIN — Medication 650 MILLIGRAM(S): at 12:21

## 2020-08-18 RX ADMIN — Medication 4 MILLIGRAM(S): at 21:10

## 2020-08-18 RX ADMIN — ENOXAPARIN SODIUM 40 MILLIGRAM(S): 100 INJECTION SUBCUTANEOUS at 17:52

## 2020-08-18 RX ADMIN — Medication 4 MILLIGRAM(S): at 05:19

## 2020-08-18 RX ADMIN — Medication 4: at 13:54

## 2020-08-18 RX ADMIN — Medication 4 MILLIGRAM(S): at 13:59

## 2020-08-18 RX ADMIN — OXYCODONE HYDROCHLORIDE 10 MILLIGRAM(S): 5 TABLET ORAL at 04:39

## 2020-08-18 RX ADMIN — OXYCODONE HYDROCHLORIDE 10 MILLIGRAM(S): 5 TABLET ORAL at 21:40

## 2020-08-18 RX ADMIN — OXYCODONE HYDROCHLORIDE 10 MILLIGRAM(S): 5 TABLET ORAL at 15:00

## 2020-08-18 RX ADMIN — SIMVASTATIN 5 MILLIGRAM(S): 20 TABLET, FILM COATED ORAL at 21:10

## 2020-08-18 RX ADMIN — OXYCODONE HYDROCHLORIDE 10 MILLIGRAM(S): 5 TABLET ORAL at 05:30

## 2020-08-18 RX ADMIN — Medication 650 MILLIGRAM(S): at 13:00

## 2020-08-18 RX ADMIN — OXYCODONE HYDROCHLORIDE 10 MILLIGRAM(S): 5 TABLET ORAL at 21:10

## 2020-08-18 RX ADMIN — Medication 40 MILLIEQUIVALENT(S): at 01:06

## 2020-08-18 RX ADMIN — LISINOPRIL 5 MILLIGRAM(S): 2.5 TABLET ORAL at 05:19

## 2020-08-18 NOTE — PHYSICAL THERAPY INITIAL EVALUATION ADULT - PERTINENT HX OF CURRENT PROBLEM, REHAB EVAL
Pt is a 55 year old male with a benign neoplasm of spinal meninges found incidentally in June 2020 during a posterior neck fatty tissue MRI. Patient medical history includes T2DM, dyslipidemia, obesity, diabetic neuropathy, lumbar spine pain with bilateral sciatica, T10-11 flattening discs, T4-10 fused discs. Pt is s/p T1 laminectomy, excision of spinal tumor.
56 y/o M with PMH of T2DM, dyslipidemia, obesity, diabetic neuropathy, lumbar spine pain with b/l sciatica, T10-11 flattening discs, T4-T10 fused discs, found to have a benign neoplasm of spinal meninges found incidentally in 6/2020. Pt now presents s/p T1 laminoplasty for meningioma.

## 2020-08-18 NOTE — PHYSICAL THERAPY INITIAL EVALUATION ADULT - PLANNED THERAPY INTERVENTIONS, PT EVAL
gait training/strengthening/balance training/GOAL: Pt will negotiate 1 flight of steps independently in 2 weeks./transfer training/bed mobility training

## 2020-08-18 NOTE — OCCUPATIONAL THERAPY INITIAL EVALUATION ADULT - ANTICIPATED DISCHARGE DISPOSITION, OT EVAL
no needs/Patient is functionally independent, no skilled OT intervention is needed. Supervision/Assist for all ADLs and functional mobility. No OT needs once discharged. Pt will require supervision/assist for all ADLs and functional mobility at home./no needs

## 2020-08-18 NOTE — PROGRESS NOTE ADULT - SUBJECTIVE AND OBJECTIVE BOX
Post-op day 3 1 S/P T1 Laminoplasty, resection of intradural tumor    OVERNIGHT EVENTS: no acute event    Vital Signs Last 24 Hrs  T(C): 36.9 (18 Aug 2020 07:08), Max: 37.8 (17 Aug 2020 20:00)  T(F): 98.4 (18 Aug 2020 07:08), Max: 100 (17 Aug 2020 20:00)  HR: 86 (18 Aug 2020 07:08) (81 - 100)  BP: 149/89 (18 Aug 2020 07:08) (123/81 - 164/96)  BP(mean): 85 (18 Aug 2020 06:00) (85 - 123)  RR: 18 (18 Aug 2020 07:08) (16 - 34)  SpO2: 95% (18 Aug 2020 07:08) (95% - 100%)    I&O's Detail    17 Aug 2020 07:01  -  18 Aug 2020 07:00  --------------------------------------------------------  IN:    IV PiggyBack: 100 mL    sodium chloride 0.9%: 1050 mL  Total IN: 1150 mL    OUT:    Accordian: 85 mL    Indwelling Catheter - Urethral: 3190 mL  Total OUT: 3275 mL    Total NET: -2125 mL        I&O's Summary    17 Aug 2020 07:01  -  18 Aug 2020 07:00  --------------------------------------------------------  IN: 1150 mL / OUT: 3275 mL / NET: -2125 mL        PHYSICAL EXAM:  Neurological:    Motor exam:         [x] Upper extremity                 D             B          T          WE       WF      HI                                               R         5/5        5/5        5/5       5/5     5/5       5/5                                               L          5/5        5/5        5/5       5/5     5/5       5/5         [x] Lower extremity                Ps          Ha        Quad    EHL        FHL                                               R        5/5        5/5        5/5       5/5         5/5                                               L         5/5        5/5       5/5       5/5          5/5                                   Sensation: [x] intact to light touch  [] decreased:        Gait    Cardiovascular: Regular  Respiratory: Clear bilaterally  Gastrointestinal: Abd soft + BS non tender  Genitourinary:  Extremities: -C/C/E  Incision/Wound: Intact    TUBES/LINES:  [] CVC  [] A-line  [] Lumbar Drain  [] Ventriculostomy  [] Other    DIET: Consistent carbohydrates Diet  [] NPO  [] Mechanical  [] Tube feeds    LABS:                        12.5   12.89 )-----------( 221      ( 18 Aug 2020 00:19 )             37.2     08-18    140  |  108  |  8   ----------------------------<  222<H>  3.7   |  23  |  0.66    Ca    9.0      18 Aug 2020 00:19  Phos  3.2     08-18  Mg     1.8     08-18              CAPILLARY BLOOD GLUCOSE      POCT Blood Glucose.: 249 mg/dL (18 Aug 2020 08:44)  POCT Blood Glucose.: 256 mg/dL (17 Aug 2020 16:07)    No Known Allergies    Intolerances      MEDICATIONS:  Antibiotics:    Neuro:  acetaminophen   Tablet .. 650 milliGRAM(s) Oral every 6 hours PRN  oxyCODONE    IR 5 milliGRAM(s) Oral every 6 hours PRN  oxyCODONE    IR 10 milliGRAM(s) Oral every 6 hours PRN    Anticoagulation:    OTHER:  bisacodyl 5 milliGRAM(s) Oral daily PRN  dexAMETHasone  Injectable 4 milliGRAM(s) IV Push every 8 hours  dextrose 40% Gel 15 Gram(s) Oral once PRN  dextrose 50% Injectable 12.5 Gram(s) IV Push once  dextrose 50% Injectable 25 Gram(s) IV Push once  dextrose 50% Injectable 25 Gram(s) IV Push once  glucagon  Injectable 1 milliGRAM(s) IntraMuscular once PRN  insulin lispro (HumaLOG) corrective regimen sliding scale   SubCutaneous three times a day before meals  lisinopril 5 milliGRAM(s) Oral daily  polyethylene glycol 3350 17 Gram(s) Oral two times a day  senna 2 Tablet(s) Oral at bedtime PRN  simvastatin 5 milliGRAM(s) Oral at bedtime    IVF:  dextrose 5%. 1000 milliLiter(s) IV Continuous <Continuous>    CULTURES:    RADIOLOGY & ADDITIONAL TESTS:      ASSESSMENT:     55 year old male with a benign neoplasm of spinal meninges found incidentally in June 2020 during a posterior neck fatty tissue MRI, being seen in preadmission testing for scheduled T1 laminectomy, excision of spinal on 8/17/2020. Patient medical history includes T2DM, dyslipidemia, obesity, diabetic neuropathy, lumbar spine pain with bilateral sciatica, T10-11 flattening discs, T4-10 fused discs.    55y Male Post-op day # 1 S/P T1 Laminoplasty, resection of intradural tumor  Please Check When Present   []  GCS  E   V  M     Heart Failure: []Acute, [] acute on chronic , []chronic  Heart Failure:  [] Diastolic (HFpEF), [] Systolic (HFrEF), []Combined (HFpEF and HFrEF), [] RHF, [] Pulm HTN, [] Other    [] NAWAF, [] ATN, [] AIN, [] other  [] CKD1, [] CKD2, [] CKD 3, [] CKD 4, [] CKD 5, []ESRD    Encephalopathy: [] Metabolic, [] Hepatic, [] toxic, [] Neurological, [] Other    Abnormal Nurtitional Status: [] malnurtition (see nutrition note), [ ]underweight: BMI < 19, [] morbid obesity: BMI >40, [] Cachexia    [] Sepsis  [] hypovolemic shock,[] cardiogenic shock, [] hemorrhagic shock, [] neuogenic shock  [] Acute Respiratory Failure  []Cerebral edema, [] Brain compression/ herniation,   [] Functional quadriplegia  [] Acute blood loss anemia      PLAN:  NEURO: stable, follow up MRI today pend, OOB, increase activity as tolerated  CARDIOVASCULAR: Hemodynamically stable  PULMONARY: No issue  RENAL: Henriquez  d/c early today , will void Ck later  GI: Tolerating CCD diet  HEME: H/H stable  ID: Afebrile  ENDO: blood sugar elevated, will add pre-meal Humolog  to sliding scale    DVT PROPHYLAXIS:  [x] Venodynes                                [x] Heparin/Lovenox    FALL RISK:  [x] Low Risk                                    [] Impulsive Post-op day 3 1 S/P T1 Laminoplasty, resection of intradural tumor    OVERNIGHT EVENTS: no acute event    Vital Signs Last 24 Hrs  T(C): 36.9 (18 Aug 2020 07:08), Max: 37.8 (17 Aug 2020 20:00)  T(F): 98.4 (18 Aug 2020 07:08), Max: 100 (17 Aug 2020 20:00)  HR: 86 (18 Aug 2020 07:08) (81 - 100)  BP: 149/89 (18 Aug 2020 07:08) (123/81 - 164/96)  BP(mean): 85 (18 Aug 2020 06:00) (85 - 123)  RR: 18 (18 Aug 2020 07:08) (16 - 34)  SpO2: 95% (18 Aug 2020 07:08) (95% - 100%)    I&O's Detail    17 Aug 2020 07:01  -  18 Aug 2020 07:00  --------------------------------------------------------  IN:    IV PiggyBack: 100 mL    sodium chloride 0.9%: 1050 mL  Total IN: 1150 mL    OUT:    Accordian: 85 mL    Indwelling Catheter - Urethral: 3190 mL  Total OUT: 3275 mL    Total NET: -2125 mL        I&O's Summary    17 Aug 2020 07:01  -  18 Aug 2020 07:00  --------------------------------------------------------  IN: 1150 mL / OUT: 3275 mL / NET: -2125 mL        PHYSICAL EXAM:  Neurological:    Motor exam:         [x] Upper extremity                 D             B          T          WE       WF      HI                                               R         5/5        5/5        5/5       5/5     5/5       5/5                                               L          5/5        5/5        5/5       5/5     5/5       5/5         [x] Lower extremity                Ps          Ha        Quad    EHL        FHL                                               R        5/5        5/5        5/5       5/5         5/5                                               L         5/5        5/5       5/5       5/5          5/5                                   Sensation: [x] intact to light touch  [] decreased:        Gait    Cardiovascular: Regular  Respiratory: Clear bilaterally  Gastrointestinal: Abd soft + BS non tender  Genitourinary:  Extremities: -C/C/E  Incision/Wound: Intact, HMV 85CC/24 hrs left in place    TUBES/LINES:  [] CVC  [] A-line  [] Lumbar Drain  [] Ventriculostomy  [] Other    DIET: Consistent carbohydrates Diet  [] NPO  [] Mechanical  [] Tube feeds    LABS:                        12.5   12.89 )-----------( 221      ( 18 Aug 2020 00:19 )             37.2     08-18    140  |  108  |  8   ----------------------------<  222<H>  3.7   |  23  |  0.66    Ca    9.0      18 Aug 2020 00:19  Phos  3.2     08-18  Mg     1.8     08-18              CAPILLARY BLOOD GLUCOSE      POCT Blood Glucose.: 249 mg/dL (18 Aug 2020 08:44)  POCT Blood Glucose.: 256 mg/dL (17 Aug 2020 16:07)    No Known Allergies    Intolerances      MEDICATIONS:  Antibiotics:    Neuro:  acetaminophen   Tablet .. 650 milliGRAM(s) Oral every 6 hours PRN  oxyCODONE    IR 5 milliGRAM(s) Oral every 6 hours PRN  oxyCODONE    IR 10 milliGRAM(s) Oral every 6 hours PRN    Anticoagulation:    OTHER:  bisacodyl 5 milliGRAM(s) Oral daily PRN  dexAMETHasone  Injectable 4 milliGRAM(s) IV Push every 8 hours  dextrose 40% Gel 15 Gram(s) Oral once PRN  dextrose 50% Injectable 12.5 Gram(s) IV Push once  dextrose 50% Injectable 25 Gram(s) IV Push once  dextrose 50% Injectable 25 Gram(s) IV Push once  glucagon  Injectable 1 milliGRAM(s) IntraMuscular once PRN  insulin lispro (HumaLOG) corrective regimen sliding scale   SubCutaneous three times a day before meals  lisinopril 5 milliGRAM(s) Oral daily  polyethylene glycol 3350 17 Gram(s) Oral two times a day  senna 2 Tablet(s) Oral at bedtime PRN  simvastatin 5 milliGRAM(s) Oral at bedtime    IVF:  dextrose 5%. 1000 milliLiter(s) IV Continuous <Continuous>    CULTURES:    RADIOLOGY & ADDITIONAL TESTS:      ASSESSMENT:     55 year old male with a benign neoplasm of spinal meninges found incidentally in June 2020 during a posterior neck fatty tissue MRI, being seen in preadmission testing for scheduled T1 laminectomy, excision of spinal on 8/17/2020. Patient medical history includes T2DM, dyslipidemia, obesity, diabetic neuropathy, lumbar spine pain with bilateral sciatica, T10-11 flattening discs, T4-10 fused discs.    55y Male Post-op day # 1 S/P T1 Laminoplasty, resection of intradural tumor  Please Check When Present   []  GCS  E   V  M     Heart Failure: []Acute, [] acute on chronic , []chronic  Heart Failure:  [] Diastolic (HFpEF), [] Systolic (HFrEF), []Combined (HFpEF and HFrEF), [] RHF, [] Pulm HTN, [] Other    [] NAWAF, [] ATN, [] AIN, [] other  [] CKD1, [] CKD2, [] CKD 3, [] CKD 4, [] CKD 5, []ESRD    Encephalopathy: [] Metabolic, [] Hepatic, [] toxic, [] Neurological, [] Other    Abnormal Nurtitional Status: [] malnurtition (see nutrition note), [ ]underweight: BMI < 19, [] morbid obesity: BMI >40, [] Cachexia    [] Sepsis  [] hypovolemic shock,[] cardiogenic shock, [] hemorrhagic shock, [] neuogenic shock  [] Acute Respiratory Failure  []Cerebral edema, [] Brain compression/ herniation,   [] Functional quadriplegia  [] Acute blood loss anemia      PLAN:  NEURO: stable, follow up MRI today pend, OOB, increase activity as tolerated  CARDIOVASCULAR: Hemodynamically stable  PULMONARY: No issue  RENAL: Henriquez  d/c early today , will void Ck later  GI: Tolerating CCD diet  HEME: H/H stable  ID: Afebrile  ENDO: blood sugar elevated, will add pre-meal Humolog  to sliding scale    DVT PROPHYLAXIS:  [x] Venodynes                                [x] Heparin/Lovenox    FALL RISK:  [x] Low Risk                                    [] Impulsive

## 2020-08-18 NOTE — OCCUPATIONAL THERAPY INITIAL EVALUATION ADULT - TRANSFER TRAINING, PT EVAL
GOAL: Pt will perform tub transfer Independently in 2 weeks: GOAL: pt will perform toilet transfer Independently in 2 weeks

## 2020-08-18 NOTE — OCCUPATIONAL THERAPY INITIAL EVALUATION ADULT - DIAGNOSIS, OT EVAL
Pt demonstrates decreased strength, balance, ROM impacting functional ambulation and participation in ADLs

## 2020-08-18 NOTE — PHYSICAL THERAPY INITIAL EVALUATION ADULT - ADDITIONAL COMMENTS
Statement Selected
PTA pt was independent with functional mobility and ADLs. Pt lives in a PH with his wife, 3 steps to enter, 1 flight inside.

## 2020-08-18 NOTE — OCCUPATIONAL THERAPY INITIAL EVALUATION ADULT - LIVES WITH, PROFILE
children/Pt lives in private house with wife and son, 3 JERMAINE and 1 flight inside.  Pt independent with all ADLs and functional mobility PTA. Pt does not own any DME./spouse

## 2020-08-18 NOTE — OCCUPATIONAL THERAPY INITIAL EVALUATION ADULT - PERTINENT HX OF CURRENT PROBLEM, REHAB EVAL
55 year old male with a benign neoplasm of spinal meninges found incidentally in June 2020 during a posterior neck fatty tissue MRI. Scheduled T1 laminectomy, excision of spinal on 8/17/2020. Patient medical history includes T2DM, dyslipidemia, obesity, diabetic neuropathy, lumbar spine pain with bilateral sciatica, T10-11 flattening discs, T4-10 fused discs.

## 2020-08-19 LAB
GLUCOSE BLDC GLUCOMTR-MCNC: 226 MG/DL — HIGH (ref 70–99)
GLUCOSE BLDC GLUCOMTR-MCNC: 242 MG/DL — HIGH (ref 70–99)
GLUCOSE BLDC GLUCOMTR-MCNC: 245 MG/DL — HIGH (ref 70–99)
GLUCOSE BLDC GLUCOMTR-MCNC: 261 MG/DL — HIGH (ref 70–99)

## 2020-08-19 RX ORDER — INSULIN LISPRO 100/ML
VIAL (ML) SUBCUTANEOUS
Refills: 0 | Status: DISCONTINUED | OUTPATIENT
Start: 2020-08-19 | End: 2020-08-19

## 2020-08-19 RX ORDER — INSULIN LISPRO 100/ML
6 VIAL (ML) SUBCUTANEOUS
Refills: 0 | Status: DISCONTINUED | OUTPATIENT
Start: 2020-08-19 | End: 2020-08-20

## 2020-08-19 RX ORDER — INSULIN LISPRO 100/ML
VIAL (ML) SUBCUTANEOUS
Refills: 0 | Status: DISCONTINUED | OUTPATIENT
Start: 2020-08-19 | End: 2020-08-21

## 2020-08-19 RX ADMIN — Medication 6 UNIT(S): at 13:10

## 2020-08-19 RX ADMIN — Medication 4: at 13:10

## 2020-08-19 RX ADMIN — OXYCODONE HYDROCHLORIDE 10 MILLIGRAM(S): 5 TABLET ORAL at 12:31

## 2020-08-19 RX ADMIN — Medication 4 MILLIGRAM(S): at 13:08

## 2020-08-19 RX ADMIN — Medication 6 UNIT(S): at 18:39

## 2020-08-19 RX ADMIN — SIMVASTATIN 5 MILLIGRAM(S): 20 TABLET, FILM COATED ORAL at 21:22

## 2020-08-19 RX ADMIN — Medication 4 MILLIGRAM(S): at 21:22

## 2020-08-19 RX ADMIN — Medication 4 MILLIGRAM(S): at 06:32

## 2020-08-19 RX ADMIN — POLYETHYLENE GLYCOL 3350 17 GRAM(S): 17 POWDER, FOR SOLUTION ORAL at 18:33

## 2020-08-19 RX ADMIN — OXYCODONE HYDROCHLORIDE 10 MILLIGRAM(S): 5 TABLET ORAL at 07:02

## 2020-08-19 RX ADMIN — Medication 4: at 18:40

## 2020-08-19 RX ADMIN — Medication 6: at 21:25

## 2020-08-19 RX ADMIN — OXYCODONE HYDROCHLORIDE 10 MILLIGRAM(S): 5 TABLET ORAL at 06:32

## 2020-08-19 RX ADMIN — OXYCODONE HYDROCHLORIDE 10 MILLIGRAM(S): 5 TABLET ORAL at 18:50

## 2020-08-19 RX ADMIN — Medication 650 MILLIGRAM(S): at 13:00

## 2020-08-19 RX ADMIN — OXYCODONE HYDROCHLORIDE 10 MILLIGRAM(S): 5 TABLET ORAL at 13:00

## 2020-08-19 RX ADMIN — Medication 650 MILLIGRAM(S): at 12:31

## 2020-08-19 RX ADMIN — ENOXAPARIN SODIUM 40 MILLIGRAM(S): 100 INJECTION SUBCUTANEOUS at 18:34

## 2020-08-19 RX ADMIN — LISINOPRIL 5 MILLIGRAM(S): 2.5 TABLET ORAL at 06:32

## 2020-08-19 RX ADMIN — Medication 4: at 08:36

## 2020-08-19 RX ADMIN — SODIUM CHLORIDE 3 MILLILITER(S): 9 INJECTION INTRAMUSCULAR; INTRAVENOUS; SUBCUTANEOUS at 17:53

## 2020-08-19 RX ADMIN — OXYCODONE HYDROCHLORIDE 10 MILLIGRAM(S): 5 TABLET ORAL at 18:32

## 2020-08-19 RX ADMIN — POLYETHYLENE GLYCOL 3350 17 GRAM(S): 17 POWDER, FOR SOLUTION ORAL at 06:31

## 2020-08-19 RX ADMIN — Medication 3 UNIT(S): at 08:37

## 2020-08-19 NOTE — PROGRESS NOTE ADULT - SUBJECTIVE AND OBJECTIVE BOX
Post-op day # 2 S/P T1 Laminoplasty, resection of intradural tumor    OVERNIGHT EVENTS: no acute event, c/o numbness around Lt big toe, around his upper abd and Rt thigh. Patient has been ambulating around comfortably    Vital Signs Last 24 Hrs  T(C): 36.8 (19 Aug 2020 08:00), Max: 36.9 (18 Aug 2020 21:55)  T(F): 98.2 (19 Aug 2020 08:00), Max: 98.5 (18 Aug 2020 21:55)  HR: 79 (19 Aug 2020 08:00) (79 - 91)  BP: 140/90 (19 Aug 2020 08:00) (110/67 - 160/75)  BP(mean): --  RR: 18 (19 Aug 2020 08:00) (18 - 18)  SpO2: 95% (19 Aug 2020 08:00) (95% - 96%)    I&O's Detail    18 Aug 2020 07:01  -  19 Aug 2020 07:00  --------------------------------------------------------  IN:    Oral Fluid: 920 mL  Total IN: 920 mL    OUT:    Accordian: 105 mL    Voided: 3050 mL  Total OUT: 3155 mL    Total NET: -2235 mL        I&O's Summary    18 Aug 2020 07:01  -  19 Aug 2020 07:00  --------------------------------------------------------  IN: 920 mL / OUT: 3155 mL / NET: -2235 mL        PHYSICAL EXAM:  Neurological:    Motor exam:         [x] Upper extremity                 D             B          T          WE       WF      HI                                               R         5/5        5/5        5/5       5/5     5/5       5/5                                               L          5/5        5/5        5/5       5/5     5/5       5/5         [x] Lower extremity                Ps          Ha        Quad    EHL        FHL                                               R        5/5        5/5        5/5       5/5         5/5                                               L         5/5        5/5       5/5       5/5          5/5                                   Sensation: [x] intact to light touch  [] decreased:        Gait : intact    Cardiovascular: Regular  Respiratory: Clear bilaterally  Gastrointestinal: Abd soft + BS non tender  Genitourinary:  Extremities: -C/C/E  Incision/Wound: Intact, HMV left in place 105 cc/24 hrs    TUBES/LINES:  [] CVC  [] A-line  [] Lumbar Drain  [] Ventriculostomy  [] Other    DIET:  [] NPO  [] Mechanical  [] Tube feeds    LABS:                        12.5   12.89 )-----------( 221      ( 18 Aug 2020 00:19 )             37.2     08-18    140  |  108  |  8   ----------------------------<  222<H>  3.7   |  23  |  0.66    Ca    9.0      18 Aug 2020 00:19  Phos  3.2     08-18  Mg     1.8     08-18              CAPILLARY BLOOD GLUCOSE      POCT Blood Glucose.: 226 mg/dL (19 Aug 2020 08:29)  POCT Blood Glucose.: 240 mg/dL (18 Aug 2020 21:13)  POCT Blood Glucose.: 296 mg/dL (18 Aug 2020 17:04)  POCT Blood Glucose.: 240 mg/dL (18 Aug 2020 13:42)          Drug Levels: [] N/A    CSF Analysis: [] N/A      Allergies    No Known Allergies    Intolerances      MEDICATIONS:  Antibiotics:    Neuro:  acetaminophen   Tablet .. 650 milliGRAM(s) Oral every 6 hours PRN  oxyCODONE    IR 5 milliGRAM(s) Oral every 6 hours PRN  oxyCODONE    IR 10 milliGRAM(s) Oral every 6 hours PRN    Anticoagulation:  enoxaparin Injectable 40 milliGRAM(s) SubCutaneous <User Schedule>    OTHER:  bisacodyl 5 milliGRAM(s) Oral daily PRN  dexAMETHasone  Injectable 4 milliGRAM(s) IV Push every 8 hours  dextrose 40% Gel 15 Gram(s) Oral once PRN  dextrose 50% Injectable 12.5 Gram(s) IV Push once  dextrose 50% Injectable 25 Gram(s) IV Push once  dextrose 50% Injectable 25 Gram(s) IV Push once  glucagon  Injectable 1 milliGRAM(s) IntraMuscular once PRN  insulin lispro (HumaLOG) corrective regimen sliding scale   SubCutaneous Before meals and at bedtime  insulin lispro Injectable (HumaLOG) 6 Unit(s) SubCutaneous three times a day before meals  lisinopril 5 milliGRAM(s) Oral daily  polyethylene glycol 3350 17 Gram(s) Oral two times a day  senna 2 Tablet(s) Oral at bedtime PRN  simvastatin 5 milliGRAM(s) Oral at bedtime    IVF:  dextrose 5%. 1000 milliLiter(s) IV Continuous <Continuous>    CULTURES:    RADIOLOGY & ADDITIONAL TESTS:        ASSESSMENT:     55 year old male with a benign neoplasm of spinal meninges found incidentally in June 2020 during a posterior neck fatty tissue MRI, being seen in preadmission testing for scheduled T1 laminectomy, excision of spinal on 8/17/2020. Patient medical history includes T2DM, dyslipidemia, obesity, diabetic neuropathy, lumbar spine pain with bilateral sciatica, T10-11 flattening discs, T4-10 fused discs.    55y Male Post-op day # 2 S/P T1 Laminoplasty, resection of intradural tumor  Please Check When Present   []  GCS  E   V  M     Heart Failure: []Acute, [] acute on chronic , []chronic  Heart Failure:  [] Diastolic (HFpEF), [] Systolic (HFrEF), []Combined (HFpEF and HFrEF), [] RHF, [] Pulm HTN, [] Other    [] NAWAF, [] ATN, [] AIN, [] other  [] CKD1, [] CKD2, [] CKD 3, [] CKD 4, [] CKD 5, []ESRD    Encephalopathy: [] Metabolic, [] Hepatic, [] toxic, [] Neurological, [] Other    Abnormal Nurtitional Status: [] malnurtition (see nutrition note), [ ]underweight: BMI < 19, [] morbid obesity: BMI >40, [] Cachexia    [] Sepsis  [] hypovolemic shock,[] cardiogenic shock, [] hemorrhagic shock, [] neuogenic shock  [] Acute Respiratory Failure  []Cerebral edema, [] Brain compression/ herniation,   [] Functional quadriplegia  [] Acute blood loss anemia      PLAN:  NEURO: stable, follow up MRI  result reviewed stable, will continue HMV, out patient PT post discharge  CARDIOVASCULAR: Hemodynamically stable  PULMONARY: No issue  RENAL: Voiding well  GI: Tolerating CCD diet  HEME: H/H stable  ID: Afebrile  ENDO: blood sugar elevated, will increase pre-meal humolog    DVT PROPHYLAXIS:  [x] Venodynes                                [x] Heparin/Lovenox    FALL RISK:  [x] Low Risk                                    [] Impulsive

## 2020-08-20 DIAGNOSIS — Z29.9 ENCOUNTER FOR PROPHYLACTIC MEASURES, UNSPECIFIED: ICD-10-CM

## 2020-08-20 DIAGNOSIS — D32.1 BENIGN NEOPLASM OF SPINAL MENINGES: ICD-10-CM

## 2020-08-20 DIAGNOSIS — I10 ESSENTIAL (PRIMARY) HYPERTENSION: ICD-10-CM

## 2020-08-20 DIAGNOSIS — E78.5 HYPERLIPIDEMIA, UNSPECIFIED: ICD-10-CM

## 2020-08-20 LAB
GLUCOSE BLDC GLUCOMTR-MCNC: 220 MG/DL — HIGH (ref 70–99)
GLUCOSE BLDC GLUCOMTR-MCNC: 264 MG/DL — HIGH (ref 70–99)
GLUCOSE BLDC GLUCOMTR-MCNC: 267 MG/DL — HIGH (ref 70–99)
GLUCOSE BLDC GLUCOMTR-MCNC: 301 MG/DL — HIGH (ref 70–99)
SURGICAL PATHOLOGY STUDY: SIGNIFICANT CHANGE UP

## 2020-08-20 PROCEDURE — 99223 1ST HOSP IP/OBS HIGH 75: CPT

## 2020-08-20 RX ORDER — INSULIN GLARGINE 100 [IU]/ML
15 INJECTION, SOLUTION SUBCUTANEOUS AT BEDTIME
Refills: 0 | Status: DISCONTINUED | OUTPATIENT
Start: 2020-08-20 | End: 2020-08-21

## 2020-08-20 RX ORDER — FAMOTIDINE 10 MG/ML
20 INJECTION INTRAVENOUS
Refills: 0 | Status: DISCONTINUED | OUTPATIENT
Start: 2020-08-20 | End: 2020-08-21

## 2020-08-20 RX ORDER — INSULIN LISPRO 100/ML
9 VIAL (ML) SUBCUTANEOUS
Refills: 0 | Status: DISCONTINUED | OUTPATIENT
Start: 2020-08-20 | End: 2020-08-21

## 2020-08-20 RX ORDER — INSULIN GLARGINE 100 [IU]/ML
10 INJECTION, SOLUTION SUBCUTANEOUS AT BEDTIME
Refills: 0 | Status: DISCONTINUED | OUTPATIENT
Start: 2020-08-20 | End: 2020-08-20

## 2020-08-20 RX ORDER — FAMOTIDINE 10 MG/ML
20 INJECTION INTRAVENOUS
Refills: 0 | Status: DISCONTINUED | OUTPATIENT
Start: 2020-08-20 | End: 2020-08-20

## 2020-08-20 RX ADMIN — Medication 4 MILLIGRAM(S): at 12:38

## 2020-08-20 RX ADMIN — Medication 650 MILLIGRAM(S): at 06:24

## 2020-08-20 RX ADMIN — FAMOTIDINE 20 MILLIGRAM(S): 10 INJECTION INTRAVENOUS at 17:46

## 2020-08-20 RX ADMIN — Medication 8: at 12:51

## 2020-08-20 RX ADMIN — Medication 4 MILLIGRAM(S): at 21:12

## 2020-08-20 RX ADMIN — Medication 6: at 21:59

## 2020-08-20 RX ADMIN — OXYCODONE HYDROCHLORIDE 10 MILLIGRAM(S): 5 TABLET ORAL at 16:06

## 2020-08-20 RX ADMIN — Medication 650 MILLIGRAM(S): at 05:54

## 2020-08-20 RX ADMIN — INSULIN GLARGINE 15 UNIT(S): 100 INJECTION, SOLUTION SUBCUTANEOUS at 22:00

## 2020-08-20 RX ADMIN — Medication 9 UNIT(S): at 17:45

## 2020-08-20 RX ADMIN — Medication 4: at 17:45

## 2020-08-20 RX ADMIN — Medication 6 UNIT(S): at 08:29

## 2020-08-20 RX ADMIN — LISINOPRIL 5 MILLIGRAM(S): 2.5 TABLET ORAL at 05:54

## 2020-08-20 RX ADMIN — Medication 4 MILLIGRAM(S): at 05:55

## 2020-08-20 RX ADMIN — OXYCODONE HYDROCHLORIDE 10 MILLIGRAM(S): 5 TABLET ORAL at 06:25

## 2020-08-20 RX ADMIN — Medication 9 UNIT(S): at 12:50

## 2020-08-20 RX ADMIN — Medication 650 MILLIGRAM(S): at 21:40

## 2020-08-20 RX ADMIN — OXYCODONE HYDROCHLORIDE 10 MILLIGRAM(S): 5 TABLET ORAL at 05:55

## 2020-08-20 RX ADMIN — ENOXAPARIN SODIUM 40 MILLIGRAM(S): 100 INJECTION SUBCUTANEOUS at 17:46

## 2020-08-20 RX ADMIN — SIMVASTATIN 5 MILLIGRAM(S): 20 TABLET, FILM COATED ORAL at 21:10

## 2020-08-20 RX ADMIN — Medication 650 MILLIGRAM(S): at 21:10

## 2020-08-20 RX ADMIN — POLYETHYLENE GLYCOL 3350 17 GRAM(S): 17 POWDER, FOR SOLUTION ORAL at 05:55

## 2020-08-20 RX ADMIN — OXYCODONE HYDROCHLORIDE 10 MILLIGRAM(S): 5 TABLET ORAL at 23:37

## 2020-08-20 RX ADMIN — OXYCODONE HYDROCHLORIDE 10 MILLIGRAM(S): 5 TABLET ORAL at 16:36

## 2020-08-20 RX ADMIN — POLYETHYLENE GLYCOL 3350 17 GRAM(S): 17 POWDER, FOR SOLUTION ORAL at 17:46

## 2020-08-20 RX ADMIN — Medication 6: at 08:29

## 2020-08-20 NOTE — CONSULT NOTE ADULT - PROBLEM SELECTOR RECOMMENDATION 9
s/p T1 laminoplasty for meningioma on 8/17/2020  - dexamethasone as per NSGY  - pepcid for GI ppx  - pain control and hemovac management as per primary team s/p T1 laminoplasty for meningioma on 8/17/2020  - surgical path: meningioma  - dexamethasone as per NSGY  - pepcid for GI ppx  - pain control and hemovac management as per primary team

## 2020-08-20 NOTE — DIETITIAN INITIAL EVALUATION ADULT. - OTHER INFO
Pertinent Information: This is a Patient is a 55 year old male that had neck MRI done and was incidentally found to have mass noted on spine.  Now presented for surgical excision. s/p t1 laminoplasty for meningioma on 8/17/2020.     Pt reports good PO intake and appetite, consumes 2-3 meals per day, though reports more recently he has been grazing throughout the day, sometimes skips breakfast. Reports he does try to limit his intake of carbohydrates during meals, has been eating more vegetables. Avoids juice, sodas. Confirms NKFA. Pt denies chewing/swallowing difficulty, nausea, vomiting, diarrhea, constipation. Takes Co Q 10 with cinnamon, vitamin D3, Zinc, B-Complex, Multivitamin. Takes Metformin, checks BG 3x per day, Hba1c: 8.2%.     Weight: pt reports UBW as 265lbs, current dosing weight is ~266lbs, suggesting relatively stable weight.     Since admission pt reports good PO intake, completing most meals. No acute GI distress noted. Last BM 8/20. Pt noted with hyperglycemia in-house, of note receiving Decadron. Pt amendable to diet education. Provided education on Carbohydrate Consistent diet including sources of carbohydrates, portion sizes, pairing protein with carbohydrates, limiting sugar sweetened beverages in diet and the importance of consistent eating pattern to help optimize glycemic control. Reviewed healthier snack options. Discussed tips for weight management. Encourage physical activity medically feasible and as recommend per MD. Patient with no nutrition-related questions at this time. Made aware RD remains available as needed.

## 2020-08-20 NOTE — DIETITIAN INITIAL EVALUATION ADULT. - ADD RECOMMEND
1) Continue current diet as tolerated. 2) Diet education provided, reinforce as needed. 3) BMI > 40kg/m2 placed in EMR, provider alerted.

## 2020-08-20 NOTE — DIETITIAN INITIAL EVALUATION ADULT. - PHYSICAL APPEARANCE
well nourished/obese/Class III/other (specify) Ht: 66inches , Wt: 265lbs, BMI: 43kg/m2, IBW: 142lbs +/- 10%, %IBW: 187%  Edema: none, Skin: free of pressure injuries per nursing flow sheets

## 2020-08-20 NOTE — CONSULT NOTE ADULT - SUBJECTIVE AND OBJECTIVE BOX
[facility] Division of Hospital Medicine  Dedra Irwin MD  Pager (M-F, 1M-6M): [phone]  Other Times:  [phone]    HPI:  55 year old male with a benign neoplasm of spinal meninges found incidentally in June 2020 during a posterior neck fatty tissue MRI, being seen in preadmission testing for scheduled T1 laminectomy, excision of spinal on 8/17/2020. Patient medical history includes T2DM, dyslipidemia, obesity, diabetic neuropathy, lumbar spine pain with bilateral sciatica, T10-11 flattening discs, T4-10 fused discs. now s/p T1 laminoplasty for meningioma on 8/17/2020. Hospital course c/b hyperglycemia.    Interval history: No issues overnight. states left big toe and right thigh numbness improving since surgery. still with some upper abd numbness but also improved post-op. no fevers, chills, chest pain, SOB, abd pain, n/v/d/c nor dysuria.     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  Dyslipidemia  H/O diabetic neuropathy  History of obesity  H/O benign neoplasm of spinal meninges  T2DM (type 2 diabetes mellitus): dx 2-3 years ago   unknown last A1c  Fatty liver: last abdominal sono 6/2020  Pinched nerve in neck: mostly left side  Pain in back: with sciatica bilateral  thoracic spine  IBS (irritable bowel syndrome): mostly loose stool  History of Helicobacter infection: 2016 treated  History of diverticulosis  History of migraine headaches  Snores  Rectal fistula: h/o rectal fistula repair  History of tonsillectomy  History of endoscopy: h/o upper and lower 2016      Review of Systems:   CONSTITUTIONAL: No fever, chills  HEENT: No sore throat, vision changes  RESPIRATORY: No cough, wheezing, shortness of breath  CARDIOVASCULAR: No chest pain, palpitations, leg edema  GASTROINTESTINAL: No abdominal pain, nausea, vomiting, diarrhea or constipation. No melena or hematochezia.  GENITOURINARY: No dysuria, frequency, hematuria  NEUROLOGICAL: No headaches, memory loss, loss of strength, numbness, or tremors +numbness of L great toe and R thigh  SKIN: No itching, burning, rashes, or lesions   MUSCULOSKELETAL: No joint pain or swelling; No muscle, back, or extremity pain  PSYCHIATRIC: No depression, anxiety  HEME/LYMPH: No easy bruising, or bleeding gums      Allergies  No Known Allergies    Intolerances        Social History:   Smokes cigar when he golfs, once every few months  Social EtOH use  Works in real estate and "side shop"    FAMILY HISTORY: no history of heart disease in mother or father      MEDICATIONS  (STANDING):  dexAMETHasone  Injectable 4 milliGRAM(s) IV Push every 8 hours  dextrose 5%. 1000 milliLiter(s) (50 mL/Hr) IV Continuous <Continuous>  dextrose 50% Injectable 12.5 Gram(s) IV Push once  dextrose 50% Injectable 25 Gram(s) IV Push once  dextrose 50% Injectable 25 Gram(s) IV Push once  enoxaparin Injectable 40 milliGRAM(s) SubCutaneous <User Schedule>  famotidine Injectable 20 milliGRAM(s) IV Push two times a day  insulin glargine Injectable (LANTUS) 15 Unit(s) SubCutaneous at bedtime  insulin lispro (HumaLOG) corrective regimen sliding scale   SubCutaneous Before meals and at bedtime  insulin lispro Injectable (HumaLOG) 9 Unit(s) SubCutaneous three times a day before meals  lisinopril 5 milliGRAM(s) Oral daily  polyethylene glycol 3350 17 Gram(s) Oral two times a day  simvastatin 5 milliGRAM(s) Oral at bedtime    MEDICATIONS  (PRN):  acetaminophen   Tablet .. 650 milliGRAM(s) Oral every 6 hours PRN Temp greater or equal to 38.5C (101.3F), Mild Pain (1 - 3)  bisacodyl 5 milliGRAM(s) Oral daily PRN Constipation  dextrose 40% Gel 15 Gram(s) Oral once PRN Blood Glucose LESS THAN 70 milliGRAM(s)/deciliter  glucagon  Injectable 1 milliGRAM(s) IntraMuscular once PRN Glucose LESS THAN 70 milligrams/deciliter  oxyCODONE    IR 5 milliGRAM(s) Oral every 6 hours PRN Moderate Pain (4 - 6)  oxyCODONE    IR 10 milliGRAM(s) Oral every 6 hours PRN Severe Pain (7 - 10)  senna 2 Tablet(s) Oral at bedtime PRN Constipation        CAPILLARY BLOOD GLUCOSE      POCT Blood Glucose.: 301 mg/dL (20 Aug 2020 12:30)  POCT Blood Glucose.: 267 mg/dL (20 Aug 2020 08:24)  POCT Blood Glucose.: 261 mg/dL (19 Aug 2020 21:15)  POCT Blood Glucose.: 242 mg/dL (19 Aug 2020 18:38)    I&O's Summary    19 Aug 2020 07:01  -  20 Aug 2020 07:00  --------------------------------------------------------  IN: 1220 mL / OUT: 2050 mL / NET: -830 mL    20 Aug 2020 07:01  -  20 Aug 2020 13:15  --------------------------------------------------------  IN: 480 mL / OUT: 430 mL / NET: 50 mL        Physical Exam:  Vital Signs Last 24 Hrs  T(C): 36.8 (20 Aug 2020 08:00), Max: 36.9 (19 Aug 2020 17:00)  T(F): 98.2 (20 Aug 2020 08:00), Max: 98.5 (19 Aug 2020 21:20)  HR: 72 (20 Aug 2020 08:00) (70 - 85)  BP: 124/73 (20 Aug 2020 08:00) (122/75 - 149/82)  BP(mean): --  RR: 18 (20 Aug 2020 08:00) (18 - 18)  SpO2: 95% (20 Aug 2020 08:00) (95% - 98%)    CONSTITUTIONAL: NAD, well-developed, well-groomed  EYES: PERRLA; conjunctiva and sclera clear  ENMT: Moist oral mucosa, no pharyngeal injection or exudates; normal dentition  NECK: Supple, no palpable masses; no thyromegaly  RESPIRATORY: Normal respiratory effort; lungs are clear to auscultation bilaterally  CARDIOVASCULAR: Regular rate and rhythm, normal S1 and S2, no murmur/rub/gallop; No lower extremity edema; Peripheral pulses are 2+ bilaterally  ABDOMEN: Soft, Nondistended,  Nontender to palpation, normoactive bowel sounds  MUSCULOSKELETAL:  No clubbing or cyanosis of digits; no joint swelling or tenderness to palpation  PSYCH: A+O to person, place, and time; affect appropriate  NEUROLOGY: CN 2-12 are intact and symmetric; no gross sensory deficits, 5/5 strength throughout. +numbness L big toe, R thigh, upper abd  SKIN: incision site c/d/i, +hemovac with serosanguinous drainage    LABS:      CAPILLARY BLOOD GLUCOSE      POCT Blood Glucose.: 301 mg/dL (20 Aug 2020 12:30)  POCT Blood Glucose.: 267 mg/dL (20 Aug 2020 08:24)  POCT Blood Glucose.: 261 mg/dL (19 Aug 2020 21:15)  POCT Blood Glucose.: 242 mg/dL (19 Aug 2020 18:38)            RADIOLOGY & ADDITIONAL TESTS:  Results Reviewed: FS with suboptimal control 200s-301 2/2 steroid  Imaging Personally Reviewed:  8/18 MRI Thoracic Spine:  FINDINGS:    VERTEBRAL BODIES: Presence of increased foci of T1 and T2 in the corners of the vertebral bodies suggest the presence of "shiny corners" of ankylosing spondylitis. Clinical correlation recommended. Evidence of prior posterior decompression with laminoplasty at the T1 level with some subtle dorsal mass impression on the posterior aspect of the thecal sac.    ALIGNMENT:  No subluxations.    INTERVERTEBRAL DISCS: Minimal disc protrusion T5-6, T6-7 and T10-11 causing indentation of the right ventral thecal sac without cord compression    NEURAL FORAMINA:  Normal.    SPINAL CORD: No evidence of cord compression at the level of the thecal sac compression    SPINAL CANAL: Calcified ligamentum flavum T10-11 causing mass effect on the dorsal left aspect of the thecal sac with some mild central canal stenosis on this basis    MISCELLANEOUS:  None.      IMPRESSION:  Status post spinal decompression with laminoplasty at the T1 level. Subtle postop changes are appreciated. No residual neoplasm is suggested. Subtle dorsal mass impression on the posterior right lateral aspect of the thecal sac without cord compression. Evidence of "shiny corners" consistent with ankylosing spondylitis. Clinical correlation recommended  Electrocardiogram Personally Reviewed:    COORDINATION OF CARE:  Care Discussed with Consultants/Other Providers [Y]: Neurosurgery PAOLO Machado  Prior or Outpatient Records Reviewed [Y/N]:

## 2020-08-20 NOTE — CHART NOTE - NSCHARTNOTEFT_GEN_A_CORE
Upon Nutritional Assessment by the Registered Dietitian your patient was determined to meet criteria / has evidence of the following diagnosis/diagnoses:            [ x] Morbid Obesity / BMI > 40      Findings as based on:  [ ] Comprehensive nutrition assessment   [ ] Nutrition Focused Physical Exam  [ x] Other: BMI 43kg/m2       Nutrition Plan/Recommendations:      see initial nutrition assessment in documents for details     Diet education provided, reinforce as needed.     RD to remain available and follow-up as medically appropriate.      PROVIDER Section:     By signing this assessment you are acknowledging and agree with the diagnosis/diagnoses assigned by the Registered Dietitian    Comments:

## 2020-08-20 NOTE — PROGRESS NOTE ADULT - SUBJECTIVE AND OBJECTIVE BOX
HPI:  Patient is a 55 year old male that had neck MRI done and was incidentally found to have mass noted on spine.  Now presented for surgical excision.    OVERNIGHT EVENTS:  No acute events overnight.  Incisional pain well controlled on current regimen.  Has been out of bed.  Tolerating diet.    Vital Signs Last 24 Hrs  T(C): 36.8 (20 Aug 2020 08:00), Max: 36.9 (19 Aug 2020 17:00)  T(F): 98.2 (20 Aug 2020 08:00), Max: 98.5 (19 Aug 2020 21:20)  HR: 72 (20 Aug 2020 08:00) (70 - 85)  BP: 124/73 (20 Aug 2020 08:00) (122/75 - 149/82)  BP(mean): --  RR: 18 (20 Aug 2020 08:00) (18 - 18)  SpO2: 95% (20 Aug 2020 08:00) (95% - 98%)    I&O's Detail    19 Aug 2020 07:01  -  20 Aug 2020 07:00  --------------------------------------------------------  IN:    Oral Fluid: 1220 mL  Total IN: 1220 mL    OUT:    Accordian: 50 mL    Voided: 2000 mL  Total OUT: 2050 mL    Total NET: -830 mL        I&O's Summary    19 Aug 2020 07:01  -  20 Aug 2020 07:00  --------------------------------------------------------  IN: 1220 mL / OUT: 2050 mL / NET: -830 mL        PHYSICAL EXAM:  Neurological: awake, alert, oriented x3, follows commands, speech clear and fluent, moves all extremities x4 w/ 5/5 strength throughout, sensation present, intact, equal throughout    Cardiovascular: +s1, s2  Respiratory: clear to auscultation b/l  Gastrointestinal: soft, non-distended, non-tender  Genitourinary: +voiding  Extremities:  Incision/Wound: posterior midline vertical incision dressing c/d/i w/ aquacel, hemovac x1    TUBES/LINES:  [x] hemovac x1 (50cc serosanguinous over 24 hours)    DIET:  [x] consistent carbohydrate    LABS:  no new labs        CAPILLARY BLOOD GLUCOSE      POCT Blood Glucose.: 267 mg/dL (20 Aug 2020 08:24)  POCT Blood Glucose.: 261 mg/dL (19 Aug 2020 21:15)  POCT Blood Glucose.: 242 mg/dL (19 Aug 2020 18:38)  POCT Blood Glucose.: 245 mg/dL (19 Aug 2020 12:59)          Allergies    No Known Allergies          MEDICATIONS:  Antibiotics:  none    Neuro:  acetaminophen   Tablet .. 650 milliGRAM(s) Oral every 6 hours PRN  oxyCODONE    IR 5 milliGRAM(s) Oral every 6 hours PRN  oxyCODONE    IR 10 milliGRAM(s) Oral every 6 hours PRN    Anticoagulation:  enoxaparin Injectable 40 milliGRAM(s) SubCutaneous <User Schedule>    OTHER:  bisacodyl 5 milliGRAM(s) Oral daily PRN  dexAMETHasone  Injectable 4 milliGRAM(s) IV Push every 8 hours  dextrose 40% Gel 15 Gram(s) Oral once PRN  dextrose 50% Injectable 12.5 Gram(s) IV Push once  dextrose 50% Injectable 25 Gram(s) IV Push once  dextrose 50% Injectable 25 Gram(s) IV Push once  glucagon  Injectable 1 milliGRAM(s) IntraMuscular once PRN  insulin lispro (HumaLOG) corrective regimen sliding scale   SubCutaneous Before meals and at bedtime  insulin lispro Injectable (HumaLOG) 6 Unit(s) SubCutaneous three times a day before meals  lisinopril 5 milliGRAM(s) Oral daily  polyethylene glycol 3350 17 Gram(s) Oral two times a day  senna 2 Tablet(s) Oral at bedtime PRN  simvastatin 5 milliGRAM(s) Oral at bedtime    IVF:  dextrose 5%. 1000 milliLiter(s) IV Continuous <Continuous>    CULTURES:  none    RADIOLOGY & ADDITIONAL TESTS:  no new imaging HPI:  Patient is a 55 year old male that had neck MRI done and was incidentally found to have mass noted on spine.  Now presented for surgical excision.    OVERNIGHT EVENTS:  No acute events overnight.  Incisional pain well controlled on current regimen.  Has been out of bed.  Tolerating diet.    Vital Signs Last 24 Hrs  T(C): 36.8 (20 Aug 2020 08:00), Max: 36.9 (19 Aug 2020 17:00)  T(F): 98.2 (20 Aug 2020 08:00), Max: 98.5 (19 Aug 2020 21:20)  HR: 72 (20 Aug 2020 08:00) (70 - 85)  BP: 124/73 (20 Aug 2020 08:00) (122/75 - 149/82)  BP(mean): --  RR: 18 (20 Aug 2020 08:00) (18 - 18)  SpO2: 95% (20 Aug 2020 08:00) (95% - 98%)    I&O's Detail    19 Aug 2020 07:01  -  20 Aug 2020 07:00  --------------------------------------------------------  IN:    Oral Fluid: 1220 mL  Total IN: 1220 mL    OUT:    Accordian: 50 mL    Voided: 2000 mL  Total OUT: 2050 mL    Total NET: -830 mL        I&O's Summary    19 Aug 2020 07:01  -  20 Aug 2020 07:00  --------------------------------------------------------  IN: 1220 mL / OUT: 2050 mL / NET: -830 mL        PHYSICAL EXAM:  Neurological: awake, alert, oriented x3, follows commands, speech clear and fluent, moves all extremities x4 w/ 5/5 strength throughout, decreased sensation upper abdomen, right lateral thigh, and left great toe, otherwise sensation present, intact, equal throughout    Cardiovascular: +s1, s2  Respiratory: clear to auscultation b/l  Gastrointestinal: soft, non-distended, non-tender  Genitourinary: +voiding  Extremities: +dp/pt pulses palpable b/l  Incision/Wound: posterior midline vertical incision dressing c/d/i w/ aquacel, hemovac x1    TUBES/LINES:  [x] hemovac x1 (50cc serosanguinous over 24 hours)    DIET:  [x] consistent carbohydrate    LABS:  no new labs        CAPILLARY BLOOD GLUCOSE      POCT Blood Glucose.: 267 mg/dL (20 Aug 2020 08:24)  POCT Blood Glucose.: 261 mg/dL (19 Aug 2020 21:15)  POCT Blood Glucose.: 242 mg/dL (19 Aug 2020 18:38)  POCT Blood Glucose.: 245 mg/dL (19 Aug 2020 12:59)          Allergies    No Known Allergies          MEDICATIONS:  Antibiotics:  none    Neuro:  acetaminophen   Tablet .. 650 milliGRAM(s) Oral every 6 hours PRN  oxyCODONE    IR 5 milliGRAM(s) Oral every 6 hours PRN  oxyCODONE    IR 10 milliGRAM(s) Oral every 6 hours PRN    Anticoagulation:  enoxaparin Injectable 40 milliGRAM(s) SubCutaneous <User Schedule>    OTHER:  bisacodyl 5 milliGRAM(s) Oral daily PRN  dexAMETHasone  Injectable 4 milliGRAM(s) IV Push every 8 hours  dextrose 40% Gel 15 Gram(s) Oral once PRN  dextrose 50% Injectable 12.5 Gram(s) IV Push once  dextrose 50% Injectable 25 Gram(s) IV Push once  dextrose 50% Injectable 25 Gram(s) IV Push once  glucagon  Injectable 1 milliGRAM(s) IntraMuscular once PRN  insulin lispro (HumaLOG) corrective regimen sliding scale   SubCutaneous Before meals and at bedtime  insulin lispro Injectable (HumaLOG) 6 Unit(s) SubCutaneous three times a day before meals  lisinopril 5 milliGRAM(s) Oral daily  polyethylene glycol 3350 17 Gram(s) Oral two times a day  senna 2 Tablet(s) Oral at bedtime PRN  simvastatin 5 milliGRAM(s) Oral at bedtime    IVF:  dextrose 5%. 1000 milliLiter(s) IV Continuous <Continuous>    CULTURES:  none    RADIOLOGY & ADDITIONAL TESTS:  no new imaging

## 2020-08-20 NOTE — PROGRESS NOTE ADULT - ASSESSMENT
HPI:  Patient is a 55 year old male that had neck MRI done and was incidentally found to have mass noted on spine.  Now presented for surgical excision.    PROCEDURE: s/p t1 laminoplasty for meningioma on 8/17/2020   POD#3    PLAN:  Neuro:   -q4 hour neuro checks  -oxycodone for pain control  -decadron 4q8  -continue hemovac, monitor output  -out of bed with assistance  -pt - outpatient pt upon discharge  -ot - no skilled needs upon discharge, supervision/assist for all ADLs    Respiratory:   -keep O2 sat > 94%  -incentive spirometer for lung expansion    CV:  -keep sbp 100-140  -lisinopril for bp control  -simvastatin for lipid control    Endocrine:   -lispro and HISS for glucose control  -keep fingersticks 100-180    Heme/Onc:    -lovenox and SCDs for DVT prophylaxis    Renal:   -voiding    ID:   -afebrile    GI:   -consistent carbohydrate diet  -senna, miralax, and dulcolax for bowel regimen  -pepcid for gi prophylaxis      Spectra #[id number]

## 2020-08-20 NOTE — CHART NOTE - NSCHARTNOTEFT_GEN_A_CORE
Hemovac taken off suction and removed with no difficulties.  Patient tolerated well with no immediate complications.  Dressing placed.

## 2020-08-20 NOTE — DIETITIAN INITIAL EVALUATION ADULT. - PERTINENT LABORATORY DATA
POCT Blood Glucose.: 301 mg/dL (08-20-20 @ 12:30)  POCT Blood Glucose.: 267 mg/dL (08-20-20 @ 08:24)  POCT Blood Glucose.: 261 mg/dL (08-19-20 @ 21:15)  POCT Blood Glucose.: 242 mg/dL (08-19-20 @ 18:38)  HbA1c: 8.2%

## 2020-08-21 ENCOUNTER — TRANSCRIPTION ENCOUNTER (OUTPATIENT)
Age: 55
End: 2020-08-21

## 2020-08-21 VITALS
HEART RATE: 77 BPM | RESPIRATION RATE: 18 BRPM | OXYGEN SATURATION: 98 % | TEMPERATURE: 98 F | DIASTOLIC BLOOD PRESSURE: 79 MMHG | SYSTOLIC BLOOD PRESSURE: 139 MMHG

## 2020-08-21 DIAGNOSIS — E78.5 HYPERLIPIDEMIA, UNSPECIFIED: ICD-10-CM

## 2020-08-21 DIAGNOSIS — E11.65 TYPE 2 DIABETES MELLITUS WITH HYPERGLYCEMIA: ICD-10-CM

## 2020-08-21 LAB
ANION GAP SERPL CALC-SCNC: 10 MMOL/L — SIGNIFICANT CHANGE UP (ref 5–17)
BUN SERPL-MCNC: 21 MG/DL — SIGNIFICANT CHANGE UP (ref 7–23)
CALCIUM SERPL-MCNC: 9.5 MG/DL — SIGNIFICANT CHANGE UP (ref 8.4–10.5)
CHLORIDE SERPL-SCNC: 98 MMOL/L — SIGNIFICANT CHANGE UP (ref 96–108)
CO2 SERPL-SCNC: 25 MMOL/L — SIGNIFICANT CHANGE UP (ref 22–31)
CREAT SERPL-MCNC: 0.71 MG/DL — SIGNIFICANT CHANGE UP (ref 0.5–1.3)
GLUCOSE BLDC GLUCOMTR-MCNC: 248 MG/DL — HIGH (ref 70–99)
GLUCOSE BLDC GLUCOMTR-MCNC: 251 MG/DL — HIGH (ref 70–99)
GLUCOSE BLDC GLUCOMTR-MCNC: 270 MG/DL — HIGH (ref 70–99)
GLUCOSE SERPL-MCNC: 302 MG/DL — HIGH (ref 70–99)
HCT VFR BLD CALC: 41.3 % — SIGNIFICANT CHANGE UP (ref 39–50)
HGB BLD-MCNC: 13.7 G/DL — SIGNIFICANT CHANGE UP (ref 13–17)
MCHC RBC-ENTMCNC: 30.1 PG — SIGNIFICANT CHANGE UP (ref 27–34)
MCHC RBC-ENTMCNC: 33.2 GM/DL — SIGNIFICANT CHANGE UP (ref 32–36)
MCV RBC AUTO: 90.8 FL — SIGNIFICANT CHANGE UP (ref 80–100)
NRBC # BLD: 0 /100 WBCS — SIGNIFICANT CHANGE UP (ref 0–0)
PLATELET # BLD AUTO: 248 K/UL — SIGNIFICANT CHANGE UP (ref 150–400)
POTASSIUM SERPL-MCNC: 4.6 MMOL/L — SIGNIFICANT CHANGE UP (ref 3.5–5.3)
POTASSIUM SERPL-SCNC: 4.6 MMOL/L — SIGNIFICANT CHANGE UP (ref 3.5–5.3)
RBC # BLD: 4.55 M/UL — SIGNIFICANT CHANGE UP (ref 4.2–5.8)
RBC # FLD: 12.9 % — SIGNIFICANT CHANGE UP (ref 10.3–14.5)
SODIUM SERPL-SCNC: 133 MMOL/L — LOW (ref 135–145)
WBC # BLD: 12.76 K/UL — HIGH (ref 3.8–10.5)
WBC # FLD AUTO: 12.76 K/UL — HIGH (ref 3.8–10.5)

## 2020-08-21 PROCEDURE — 99254 IP/OBS CNSLTJ NEW/EST MOD 60: CPT

## 2020-08-21 PROCEDURE — 76000 FLUOROSCOPY <1 HR PHYS/QHP: CPT

## 2020-08-21 PROCEDURE — 88331 PATH CONSLTJ SURG 1 BLK 1SPC: CPT

## 2020-08-21 PROCEDURE — 97530 THERAPEUTIC ACTIVITIES: CPT

## 2020-08-21 PROCEDURE — 88342 IMHCHEM/IMCYTCHM 1ST ANTB: CPT

## 2020-08-21 PROCEDURE — 88334 PATH CONSLTJ SURG CYTO XM EA: CPT

## 2020-08-21 PROCEDURE — 88360 TUMOR IMMUNOHISTOCHEM/MANUAL: CPT

## 2020-08-21 PROCEDURE — 86900 BLOOD TYPING SEROLOGIC ABO: CPT

## 2020-08-21 PROCEDURE — A9585: CPT

## 2020-08-21 PROCEDURE — 97161 PT EVAL LOW COMPLEX 20 MIN: CPT

## 2020-08-21 PROCEDURE — 88305 TISSUE EXAM BY PATHOLOGIST: CPT

## 2020-08-21 PROCEDURE — 72157 MRI CHEST SPINE W/O & W/DYE: CPT

## 2020-08-21 PROCEDURE — 86901 BLOOD TYPING SEROLOGIC RH(D): CPT

## 2020-08-21 PROCEDURE — 88307 TISSUE EXAM BY PATHOLOGIST: CPT

## 2020-08-21 PROCEDURE — 84100 ASSAY OF PHOSPHORUS: CPT

## 2020-08-21 PROCEDURE — 97166 OT EVAL MOD COMPLEX 45 MIN: CPT

## 2020-08-21 PROCEDURE — 99232 SBSQ HOSP IP/OBS MODERATE 35: CPT

## 2020-08-21 PROCEDURE — C1889: CPT

## 2020-08-21 PROCEDURE — C1713: CPT

## 2020-08-21 PROCEDURE — 85027 COMPLETE CBC AUTOMATED: CPT

## 2020-08-21 PROCEDURE — 80048 BASIC METABOLIC PNL TOTAL CA: CPT

## 2020-08-21 PROCEDURE — 82962 GLUCOSE BLOOD TEST: CPT

## 2020-08-21 PROCEDURE — 97116 GAIT TRAINING THERAPY: CPT

## 2020-08-21 PROCEDURE — 83735 ASSAY OF MAGNESIUM: CPT

## 2020-08-21 RX ORDER — INSULIN LISPRO 100/ML
VIAL (ML) SUBCUTANEOUS
Refills: 0 | Status: DISCONTINUED | OUTPATIENT
Start: 2020-08-21 | End: 2020-08-21

## 2020-08-21 RX ORDER — INSULIN GLARGINE 100 [IU]/ML
22 INJECTION, SOLUTION SUBCUTANEOUS AT BEDTIME
Refills: 0 | Status: DISCONTINUED | OUTPATIENT
Start: 2020-08-21 | End: 2020-08-21

## 2020-08-21 RX ORDER — OXYCODONE HYDROCHLORIDE 5 MG/1
1 TABLET ORAL
Qty: 20 | Refills: 0
Start: 2020-08-21 | End: 2020-08-25

## 2020-08-21 RX ORDER — DEXAMETHASONE 0.5 MG/5ML
3 ELIXIR ORAL EVERY 8 HOURS
Refills: 0 | Status: DISCONTINUED | OUTPATIENT
Start: 2020-08-21 | End: 2020-08-21

## 2020-08-21 RX ORDER — INSULIN LISPRO 100/ML
15 VIAL (ML) SUBCUTANEOUS
Refills: 0 | Status: DISCONTINUED | OUTPATIENT
Start: 2020-08-21 | End: 2020-08-21

## 2020-08-21 RX ORDER — SENNA PLUS 8.6 MG/1
2 TABLET ORAL
Qty: 0 | Refills: 0 | DISCHARGE
Start: 2020-08-21

## 2020-08-21 RX ORDER — INSULIN LISPRO 100/ML
VIAL (ML) SUBCUTANEOUS AT BEDTIME
Refills: 0 | Status: DISCONTINUED | OUTPATIENT
Start: 2020-08-21 | End: 2020-08-21

## 2020-08-21 RX ADMIN — Medication 650 MILLIGRAM(S): at 07:14

## 2020-08-21 RX ADMIN — Medication 4 MILLIGRAM(S): at 06:47

## 2020-08-21 RX ADMIN — OXYCODONE HYDROCHLORIDE 10 MILLIGRAM(S): 5 TABLET ORAL at 00:07

## 2020-08-21 RX ADMIN — FAMOTIDINE 20 MILLIGRAM(S): 10 INJECTION INTRAVENOUS at 06:47

## 2020-08-21 RX ADMIN — Medication 15 UNIT(S): at 13:00

## 2020-08-21 RX ADMIN — OXYCODONE HYDROCHLORIDE 10 MILLIGRAM(S): 5 TABLET ORAL at 08:24

## 2020-08-21 RX ADMIN — OXYCODONE HYDROCHLORIDE 10 MILLIGRAM(S): 5 TABLET ORAL at 13:01

## 2020-08-21 RX ADMIN — Medication 650 MILLIGRAM(S): at 06:47

## 2020-08-21 RX ADMIN — Medication 4: at 17:28

## 2020-08-21 RX ADMIN — INSULIN GLARGINE 22 UNIT(S): 100 INJECTION, SOLUTION SUBCUTANEOUS at 18:26

## 2020-08-21 RX ADMIN — Medication 15 UNIT(S): at 17:28

## 2020-08-21 RX ADMIN — ENOXAPARIN SODIUM 40 MILLIGRAM(S): 100 INJECTION SUBCUTANEOUS at 17:29

## 2020-08-21 RX ADMIN — OXYCODONE HYDROCHLORIDE 10 MILLIGRAM(S): 5 TABLET ORAL at 13:31

## 2020-08-21 RX ADMIN — Medication 6: at 08:27

## 2020-08-21 RX ADMIN — Medication 6: at 13:01

## 2020-08-21 RX ADMIN — Medication 9 UNIT(S): at 08:26

## 2020-08-21 RX ADMIN — Medication 3 MILLIGRAM(S): at 13:00

## 2020-08-21 RX ADMIN — OXYCODONE HYDROCHLORIDE 10 MILLIGRAM(S): 5 TABLET ORAL at 07:54

## 2020-08-21 RX ADMIN — FAMOTIDINE 20 MILLIGRAM(S): 10 INJECTION INTRAVENOUS at 17:30

## 2020-08-21 RX ADMIN — LISINOPRIL 5 MILLIGRAM(S): 2.5 TABLET ORAL at 06:47

## 2020-08-21 NOTE — CONSULT NOTE ADULT - PROBLEM SELECTOR RECOMMENDATION 9
Diabetes Education and Nutrition Eval  Decadron discontinued today. Effects may last 24-48 hours after last dose given long acting nature of the steroid.  If remains in-patient increased Lantus to 22 units qhs and increased Humalog to 15 units. If he goes home he will likely only have 1 day of higher glucose values therefore can give Lantus 22 units x 1 prior to discharge tonight and resume metformin 1000mg BID.   Goal glucose 100-180  Outpt. endo follow-up with Dr. Shah in [place]  Outpt. optho, podiatry, nephrology  Plan to d/c on metformin.

## 2020-08-21 NOTE — PROGRESS NOTE ADULT - ASSESSMENT
55 year old male with T2DM (HbA1c 8.2%), HLD, fatty liver disease found to have intradural mass on MRI now s/p T1 laminoplasty for meningioma on 8/17/2020. post-op complicated by steroid induced hyperglycemia.

## 2020-08-21 NOTE — CONSULT NOTE ADULT - SUBJECTIVE AND OBJECTIVE BOX
HPI:  54 y/o M w/ hx of Type 2 DM x 2 years without reported microvascular complications. At home on metformin 1000mg BID with some nonadherence over the past 3 months. Follows with PCP.  55 year old male with a benign neoplasm of spinal meninges found incidentally in June 2020 during a posterior neck fatty tissue MRI, being seen in preadmission testing for scheduled T1 laminectomy, excision of spinal on 8/17/2020. Patient medical history includes T2DM, dyslipidemia, obesity, diabetic neuropathy, lumbar spine pain with bilateral sciatica, T10-11 flattening discs, T4-10 fused discs.    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  Dyslipidemia  H/O diabetic neuropathy  History of obesity  H/O benign neoplasm of spinal meninges  T2DM (type 2 diabetes mellitus): dx 2-3 years ago   unknown last A1c  Fatty liver: last abdominal sono 6/2020  Pinched nerve in neck: mostly left side  Pain in back: with sciatica bilateral  thoracic spine  IBS (irritable bowel syndrome): mostly loose stool  History of Helicobacter infection: 2016 treated  History of diverticulosis  History of migraine headaches  Snores  Rectal fistula: h/o rectal fistula repair  History of tonsillectomy  History of endoscopy: h/o upper and lower 2016      FAMILY HISTORY:      Social History:    Outpatient Medications:    MEDICATIONS  (STANDING):  dextrose 5%. 1000 milliLiter(s) (50 mL/Hr) IV Continuous <Continuous>  dextrose 50% Injectable 12.5 Gram(s) IV Push once  dextrose 50% Injectable 25 Gram(s) IV Push once  dextrose 50% Injectable 25 Gram(s) IV Push once  enoxaparin Injectable 40 milliGRAM(s) SubCutaneous <User Schedule>  famotidine Injectable 20 milliGRAM(s) IV Push two times a day  insulin glargine Injectable (LANTUS) 22 Unit(s) SubCutaneous at bedtime  insulin lispro (HumaLOG) corrective regimen sliding scale   SubCutaneous three times a day with meals  insulin lispro (HumaLOG) corrective regimen sliding scale   SubCutaneous at bedtime  insulin lispro Injectable (HumaLOG) 15 Unit(s) SubCutaneous three times a day before meals  lisinopril 5 milliGRAM(s) Oral daily  polyethylene glycol 3350 17 Gram(s) Oral two times a day  simvastatin 5 milliGRAM(s) Oral at bedtime    MEDICATIONS  (PRN):  acetaminophen   Tablet .. 650 milliGRAM(s) Oral every 6 hours PRN Temp greater or equal to 38.5C (101.3F), Mild Pain (1 - 3)  bisacodyl 5 milliGRAM(s) Oral daily PRN Constipation  dextrose 40% Gel 15 Gram(s) Oral once PRN Blood Glucose LESS THAN 70 milliGRAM(s)/deciliter  glucagon  Injectable 1 milliGRAM(s) IntraMuscular once PRN Glucose LESS THAN 70 milligrams/deciliter  oxyCODONE    IR 5 milliGRAM(s) Oral every 6 hours PRN Moderate Pain (4 - 6)  oxyCODONE    IR 10 milliGRAM(s) Oral every 6 hours PRN Severe Pain (7 - 10)  senna 2 Tablet(s) Oral at bedtime PRN Constipation      Allergies    No Known Allergies    Intolerances      Review of Systems:  Constitutional: No fever, No change in weight  Eyes: No blurry vision  Neuro: No headache, No paresthesias  HEENT: No throat pain  Cardiovascular: No chest pain  Respiratory: No SOB  GI: No nausea or vomiting  : No polyuria  Skin: no rash  Psych: no depression  Endocrine: No polydipsia, No heat or cold intolerance, rest as noted in HPI  Hem/lymph: no swelling    All other review of systems negative      PHYSICAL EXAM:  VITALS: T(C): 36.6 (08-21-20 @ 17:01)  T(F): 97.8 (08-21-20 @ 17:01), Max: 98.2 (08-21-20 @ 08:00)  HR: 77 (08-21-20 @ 17:01) (68 - 81)  BP: 139/79 (08-21-20 @ 17:01) (108/64 - 139/79)  RR:  (18 - 18)  SpO2:  (95% - 98%)  Wt(kg): --  GENERAL: NAD at this time  EYES: No proptosis, EOMI  HEENT:  Atraumatic, Normocephalic,   THYROID: Normal size, no palpable nodules  RESPIRATORY: Clear to auscultation bilaterally, full excursion, non-labored  CARDIOVASCULAR: Regular rhythm; No murmurs; no peripheral edema  GI: Soft, nontender, non distended, normal bowel sounds  SKIN: Dry, intact, No rashes or lesions  MUSCULOSKELETAL: normal strength  NEURO: follows commands, no tremor, normal reflexes  PSYCH: Alert and oriented x 3, normal affect, normal mood  CUSHING'S SIGNS: no striae      POCT Blood Glucose.: 248 mg/dL (08-21-20 @ 17:22)  POCT Blood Glucose.: 251 mg/dL (08-21-20 @ 12:51)  POCT Blood Glucose.: 270 mg/dL (08-21-20 @ 08:12)  POCT Blood Glucose.: 264 mg/dL (08-20-20 @ 21:24)  POCT Blood Glucose.: 220 mg/dL (08-20-20 @ 17:33)  POCT Blood Glucose.: 301 mg/dL (08-20-20 @ 12:30)  POCT Blood Glucose.: 267 mg/dL (08-20-20 @ 08:24)  POCT Blood Glucose.: 261 mg/dL (08-19-20 @ 21:15)  POCT Blood Glucose.: 242 mg/dL (08-19-20 @ 18:38)  POCT Blood Glucose.: 245 mg/dL (08-19-20 @ 12:59)  POCT Blood Glucose.: 226 mg/dL (08-19-20 @ 08:29)  POCT Blood Glucose.: 240 mg/dL (08-18-20 @ 21:13)                              13.7   12.76 )-----------( 248      ( 21 Aug 2020 06:45 )             41.3       08-21    133<L>  |  98  |  21  ----------------------------<  302<H>  4.6   |  25  |  0.71    EGFR if : 122  EGFR if non : 106    Ca    9.5      08-21      Thyroid Function Tests:            Radiology: HPI:  56 y/o M w/ hx of Type 2 DM x 2 years without reported microvascular complications. At home on metformin 1000mg BID with some nonadherence over the past 3 months. Follows with PCP. Checks glucose 2x/day with values generally in 100's without hypoglycemia. Trying to monitor carbs better recently. No polyuria, polydipsia, nausea or vomiting.   Also hx of benign neoplasm of spinal meninges found incidentally in June 2020 during a posterior neck fatty tissue MRI, being s/p T1 laminectomy, excision of spinal on 8/17/2020. Patient medical history includes T2DM, dyslipidemia, obesity, lumbar spine pain with bilateral sciatica, T10-11 flattening discs, T4-10 fused discs.      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  Dyslipidemia  H/O diabetic neuropathy  History of obesity  H/O benign neoplasm of spinal meninges  T2DM (type 2 diabetes mellitus): dx 2-3 years ago   unknown last A1c  Fatty liver: last abdominal sono 6/2020  Pinched nerve in neck: mostly left side  Pain in back: with sciatica bilateral  thoracic spine  IBS (irritable bowel syndrome): mostly loose stool  History of Helicobacter infection: 2016 treated  History of diverticulosis  History of migraine headaches  Snores  Rectal fistula: h/o rectal fistula repair  History of tonsillectomy  History of endoscopy: h/o upper and lower 2016      FAMILY HISTORY: No family hx of diabetes in parents or children      Social History: +social cigar smoking. +social alcohol use    Outpatient Medications:  · 	metFORMIN 1000 mg oral tablet: Last Dose Taken:  , 1 tab(s) orally 2 times a day  · 	simvastatin 5 mg oral tablet: Last Dose Taken:  , 1 tab(s) orally once a day (at bedtime)  · 	ramipril 1.25 mg oral tablet: Last Dose Taken:  , 1 tablet orally once a day in the evening   · 	clotrimazole 1% topical lotion (obsolete): Last Dose Taken:  , PRN  · 	CoQ10: Last Dose Taken:  , Coq10/ cinnamon - 1 tablet orally once a day   · 	complex B: Last Dose Taken:  , 1 tablet orally once a day   · 	Zinc: Last Dose Taken:  , 25 mg orally once a day   · 	Vitamin D3 2000 intl units (50 mcg) oral tablet: Last Dose Taken:  , 1 tablet orally once a day   · 	multivitamin: Last Dose Taken:  , 1 tablet orally once a day     MEDICATIONS  (STANDING):  dextrose 5%. 1000 milliLiter(s) (50 mL/Hr) IV Continuous <Continuous>  dextrose 50% Injectable 12.5 Gram(s) IV Push once  dextrose 50% Injectable 25 Gram(s) IV Push once  dextrose 50% Injectable 25 Gram(s) IV Push once  enoxaparin Injectable 40 milliGRAM(s) SubCutaneous <User Schedule>  famotidine Injectable 20 milliGRAM(s) IV Push two times a day  insulin glargine Injectable (LANTUS) 22 Unit(s) SubCutaneous at bedtime  insulin lispro (HumaLOG) corrective regimen sliding scale   SubCutaneous three times a day with meals  insulin lispro (HumaLOG) corrective regimen sliding scale   SubCutaneous at bedtime  insulin lispro Injectable (HumaLOG) 15 Unit(s) SubCutaneous three times a day before meals  lisinopril 5 milliGRAM(s) Oral daily  polyethylene glycol 3350 17 Gram(s) Oral two times a day  simvastatin 5 milliGRAM(s) Oral at bedtime    MEDICATIONS  (PRN):  acetaminophen   Tablet .. 650 milliGRAM(s) Oral every 6 hours PRN Temp greater or equal to 38.5C (101.3F), Mild Pain (1 - 3)  bisacodyl 5 milliGRAM(s) Oral daily PRN Constipation  dextrose 40% Gel 15 Gram(s) Oral once PRN Blood Glucose LESS THAN 70 milliGRAM(s)/deciliter  glucagon  Injectable 1 milliGRAM(s) IntraMuscular once PRN Glucose LESS THAN 70 milligrams/deciliter  oxyCODONE    IR 5 milliGRAM(s) Oral every 6 hours PRN Moderate Pain (4 - 6)  oxyCODONE    IR 10 milliGRAM(s) Oral every 6 hours PRN Severe Pain (7 - 10)  senna 2 Tablet(s) Oral at bedtime PRN Constipation      Allergies    No Known Allergies    Intolerances      Review of Systems:  Constitutional: No fever, No change in weight  Eyes: No blurry vision  Neuro: No headache, No paresthesias  HEENT: No throat pain  Cardiovascular: No chest pain  Respiratory: No SOB  GI: No nausea or vomiting  : No polyuria  Skin: no rash  Psych: no depression  Endocrine: No polydipsia, No heat or cold intolerance, rest as noted in HPI  Hem/lymph: no swelling    All other review of systems negative      PHYSICAL EXAM:  VITALS: T(C): 36.6 (08-21-20 @ 17:01)  T(F): 97.8 (08-21-20 @ 17:01), Max: 98.2 (08-21-20 @ 08:00)  HR: 77 (08-21-20 @ 17:01) (68 - 81)  BP: 139/79 (08-21-20 @ 17:01) (108/64 - 139/79)  RR:  (18 - 18)  SpO2:  (95% - 98%)  Wt(kg): --  GENERAL: NAD at this time  EYES: No proptosis, EOMI  HEENT:  Atraumatic, Normocephalic,   THYROID: Normal size, no palpable nodules  RESPIRATORY: Clear to auscultation bilaterally, full excursion, non-labored  CARDIOVASCULAR: Regular rhythm; No murmurs; no peripheral edema  GI: Soft, nontender, non distended, normal bowel sounds  SKIN: Dry, intact, No rashes or lesions  MUSCULOSKELETAL: normal strength  NEURO: follows commands  PSYCH: Alert and oriented x 3, normal affect, normal mood  CUSHING'S SIGNS: no striae      POCT Blood Glucose.: 248 mg/dL (08-21-20 @ 17:22)  POCT Blood Glucose.: 251 mg/dL (08-21-20 @ 12:51)  POCT Blood Glucose.: 270 mg/dL (08-21-20 @ 08:12)  POCT Blood Glucose.: 264 mg/dL (08-20-20 @ 21:24)  POCT Blood Glucose.: 220 mg/dL (08-20-20 @ 17:33)  POCT Blood Glucose.: 301 mg/dL (08-20-20 @ 12:30)  POCT Blood Glucose.: 267 mg/dL (08-20-20 @ 08:24)  POCT Blood Glucose.: 261 mg/dL (08-19-20 @ 21:15)  POCT Blood Glucose.: 242 mg/dL (08-19-20 @ 18:38)  POCT Blood Glucose.: 245 mg/dL (08-19-20 @ 12:59)  POCT Blood Glucose.: 226 mg/dL (08-19-20 @ 08:29)  POCT Blood Glucose.: 240 mg/dL (08-18-20 @ 21:13)                              13.7   12.76 )-----------( 248      ( 21 Aug 2020 06:45 )             41.3       08-21    133<L>  |  98  |  21  ----------------------------<  302<H>  4.6   |  25  |  0.71    EGFR if : 122  EGFR if non : 106    Ca    9.5      08-21      Thyroid Function Tests:            Radiology:

## 2020-08-21 NOTE — DISCHARGE NOTE NURSING/CASE MANAGEMENT/SOCIAL WORK - PATIENT PORTAL LINK FT
You can access the FollowMyHealth Patient Portal offered by [facility] by registering at the following website: [email or web address]. By joining [email or web address]’s FollowMyHealth portal, you will also be able to view your health information using other applications (apps) compatible with our system.

## 2020-08-21 NOTE — CONSULT NOTE ADULT - ASSESSMENT
55 year old male with T2DM (HbA1c 8.2%), HLD, fatty liver disease found to have intradural mass on MRI now s/p T1 laminoplasty for meningioma on 8/17/2020. post-op complicated by steroid induced hyperglycemia.
54 y/o M w/ Type 2 DM w/ hyperglycemia exacerbated by steroids now off steroids

## 2020-08-21 NOTE — DISCHARGE NOTE PROVIDER - NSDCFUADDINST_GEN_ALL_CORE_FT
s/p t1 laminoplasty for meningioma on 8/17/2020  No strenuous activity.  No lifting.  No twisting or bending.  Do not return to work until cleared to do so by physician.  No driving until cleared to do so by physician.  keep incisions clean and dry. do not get incision wet until post op day #4. do not soak in water. no baths. pat dry. do not apply cream/moisture to incisions.  items for follow up: wound check, suture removal, final pathology    follow up with neurosurgery, Dr. Estevez, within 1 week of discharge, call [phone]  follow up with PMD within 1 week of discharge s/p t1 laminoplasty for meningioma on 8/17/2020  No strenuous activity.  No lifting.  No twisting or bending.  Do not return to work until cleared to do so by physician.  No driving until cleared to do so by physician.  keep incisions clean and dry. do not get incision wet until post op day #4. do not soak in water. no baths. pat dry. do not apply cream/moisture to incisions.  items for follow up: wound check, suture removal    follow up with neurosurgery, Dr. Estevez, within 1 week of discharge, call [phone]  follow up with endocrinology, Dr. Shah, within 1 week of discharge, call [phone]  follow up with PMD within 1 week of discharge

## 2020-08-21 NOTE — PROGRESS NOTE ADULT - PROBLEM SELECTOR PLAN 1
s/p T1 laminoplasty for meningioma on 8/17/2020  - surgical path: meningioma  - dexamethasone as per NSGY, decreased today to 3mg PO q8h  - pepcid for GI ppx  - pain control per primary team

## 2020-08-21 NOTE — DISCHARGE NOTE PROVIDER - CARE PROVIDER_API CALL
Jcarlos Estevez  NEUROSURGERY  [address] [place] [address], [facility] [address]  [place] [address]  Phone: [phone]  Fax: [phone]  Follow Up Time: 1 week

## 2020-08-21 NOTE — CONSULT NOTE ADULT - PROBLEM SELECTOR PROBLEM 1
Spinal meningioma
Type 2 diabetes mellitus with hyperglycemia, without long-term current use of insulin

## 2020-08-21 NOTE — PROGRESS NOTE ADULT - SUBJECTIVE AND OBJECTIVE BOX
HPI:  Patient is a 55 year old male that had neck MRI done and was incidentally found to have mass noted on spine.  Now presented for surgical excision.    OVERNIGHT EVENTS:  No acute events overnight.  Incisional pain controlled on current regimen.  Blood glucose still elevated yesterday so hospitalist saw patient, increased pre-meal lispro and started on lantus.  Still on decadron.  Has been out of bed and ambulating.  Tolerating diet.    Vital Signs Last 24 Hrs  T(C): 36.4 (21 Aug 2020 04:50), Max: 36.9 (20 Aug 2020 15:44)  T(F): 97.6 (21 Aug 2020 04:50), Max: 98.4 (20 Aug 2020 15:44)  HR: 68 (21 Aug 2020 04:50) (68 - 81)  BP: 108/64 (21 Aug 2020 04:50) (108/64 - 144/66)  BP(mean): --  RR: 18 (21 Aug 2020 04:50) (18 - 18)  SpO2: 96% (21 Aug 2020 04:50) (95% - 98%)    I&O's Detail    20 Aug 2020 07:01  -  21 Aug 2020 07:00  --------------------------------------------------------  IN:    Oral Fluid: 980 mL  Total IN: 980 mL    OUT:    Accordian: 30 mL    Voided: 400 mL  Total OUT: 430 mL    Total NET: 550 mL        I&O's Summary    20 Aug 2020 07:01  -  21 Aug 2020 07:00  --------------------------------------------------------  IN: 980 mL / OUT: 430 mL / NET: 550 mL        PHYSICAL EXAM:  Neurological: awake, alert, oriented x3, follows commands, speech clear and fluent, moves all extremities x4 w/ 5/5 strength throughout, decreased sensation upper abdomen, right lateral thigh, and left great toe, otherwise sensation present, intact, equal throughout    Cardiovascular: +s1, s2  Respiratory: clear to auscultation b/l  Gastrointestinal: soft, non-distended, non-tender  Genitourinary: +voiding  Extremities: +dp/pt pulses palpable b/l  Incision/Wound: posterior midline vertical incision c/d/i w/ suture    TUBES/LINES:  [x] none    DIET:  [x] consistent carbohydrate    LABS:                        13.7   12.76 )-----------( 248      ( 21 Aug 2020 06:45 )             41.3     08-21    133<L>  |  98  |  21  ----------------------------<  302<H>  4.6   |  25  |  0.71    Ca    9.5      21 Aug 2020 06:45            CAPILLARY BLOOD GLUCOSE      POCT Blood Glucose.: 264 mg/dL (20 Aug 2020 21:24)  POCT Blood Glucose.: 220 mg/dL (20 Aug 2020 17:33)  POCT Blood Glucose.: 301 mg/dL (20 Aug 2020 12:30)  POCT Blood Glucose.: 267 mg/dL (20 Aug 2020 08:24)            Allergies    No Known Allergies        MEDICATIONS:  Antibiotics:  none    Neuro:  acetaminophen   Tablet .. 650 milliGRAM(s) Oral every 6 hours PRN  oxyCODONE    IR 5 milliGRAM(s) Oral every 6 hours PRN  oxyCODONE    IR 10 milliGRAM(s) Oral every 6 hours PRN    Anticoagulation:  enoxaparin Injectable 40 milliGRAM(s) SubCutaneous <User Schedule>    OTHER:  bisacodyl 5 milliGRAM(s) Oral daily PRN  dexAMETHasone  Injectable 4 milliGRAM(s) IV Push every 8 hours  dextrose 40% Gel 15 Gram(s) Oral once PRN  dextrose 50% Injectable 12.5 Gram(s) IV Push once  dextrose 50% Injectable 25 Gram(s) IV Push once  dextrose 50% Injectable 25 Gram(s) IV Push once  famotidine Injectable 20 milliGRAM(s) IV Push two times a day  glucagon  Injectable 1 milliGRAM(s) IntraMuscular once PRN  insulin glargine Injectable (LANTUS) 15 Unit(s) SubCutaneous at bedtime  insulin lispro (HumaLOG) corrective regimen sliding scale   SubCutaneous Before meals and at bedtime  insulin lispro Injectable (HumaLOG) 9 Unit(s) SubCutaneous three times a day before meals  lisinopril 5 milliGRAM(s) Oral daily  polyethylene glycol 3350 17 Gram(s) Oral two times a day  senna 2 Tablet(s) Oral at bedtime PRN  simvastatin 5 milliGRAM(s) Oral at bedtime    IVF:  dextrose 5%. 1000 milliLiter(s) IV Continuous <Continuous>    CULTURES:  none    RADIOLOGY & ADDITIONAL TESTS:  no new imaging

## 2020-08-21 NOTE — DISCHARGE NOTE PROVIDER - HOSPITAL COURSE
Patient is a 55 year old male that had neck MRI done and was incidentally found to have mass noted on spine.  Now presented for surgical excision.  Underwent t1 laminoplasty for meningioma on 8/17/2020.  Tolerated procedure well with no immediate complications.  Oxycodone used for pain control.  Placed on decadron.  Home medications restarted.  MRI was done which showed post operative changes and no residual neoplasm.  Final pathology pending.  Blood sugars elevated as patient has history of diabetes and now on decadron.  Hospitalist consulted and started patient on lantus and increased pre-meal lispro.  Physical therapy evaluated patient and said outpatient pt upon discharge.  Occupational therapy evaluated patient and said no skilled needs upon discharge, supervision/assist for all ADLs. Patient is a 55 year old male that had neck MRI done and was incidentally found to have mass noted on spine.  Now presented for surgical excision.  Underwent t1 laminoplasty for meningioma on 8/17/2020.  Tolerated procedure well with no immediate complications.  Oxycodone used for pain control.  Placed on decadron.  Home medications restarted.  MRI was done which showed post operative changes and no residual neoplasm.  Final pathology came back as meningioma.  Blood sugars elevated as patient has history of diabetes and now on decadron.  Hospitalist consulted and started patient on lantus and increased pre-meal lispro.  Decadron stopped.  Endocrine saw patient and recommended one more dose of lantus and then patient can be discharged on home diabetes regimen.  Physical therapy evaluated patient and said outpatient pt upon discharge.  Occupational therapy evaluated patient and said no skilled needs upon discharge, supervision/assist for all ADLs.  Stable for discharge. Patient is a 55 year old male that had neck MRI done and was incidentally found to have mass noted on spine.  Now presented for surgical excision.  Underwent t1 laminoplasty for meningioma on 8/17/2020.  Tolerated procedure well with no immediate complications.  Oxycodone used for pain control.  Placed on decadron.  Home medications restarted.  MRI was done which showed post operative changes and no residual neoplasm.  Final pathology came back as meningioma.  Blood sugars elevated as patient has history of diabetes and now on decadron.  Hospitalist consulted and started patient on lantus and increased pre-meal lispro.  Decadron stopped.  Endocrine saw patient and recommended one more dose of lantus and then patient can be discharged on home diabetes regimen.  Physical therapy evaluated patient and said outpatient pt upon discharge.  Occupational therapy evaluated patient and said no skilled needs upon discharge, supervision/assist for all ADLs.  Stable for discharge.                  I-stop reference #[id number]

## 2020-08-21 NOTE — DISCHARGE NOTE PROVIDER - NSDCACTIVITY_GEN_ALL_CORE
Walking - Indoors allowed/No heavy lifting/straining/Do not drive or operate machinery/Do not make important decisions

## 2020-08-21 NOTE — PROGRESS NOTE ADULT - ASSESSMENT
HPI:  Patient is a 55 year old male that had neck MRI done and was incidentally found to have mass noted on spine.  Now presented for surgical excision.    PROCEDURE: s/p t1 laminoplasty for meningioma on 8/17/2020   POD#4    PLAN:  Neuro:   -q4 hour neuro checks  -oxycodone for pain control  -decadron tapered to 3q8  -out of bed with assistance  -pt - outpatient pt upon discharge  -ot - no skilled needs upon discharge, supervision/assist for all ADLs    Respiratory:   -keep O2 sat > 94%  -incentive spirometer for lung expansion    CV:  -keep sbp 100-140  -lisinopril for bp control  -simvastatin for lipid control    Endocrine:   -lantus, lispro, and HISS for glucose control  -keep fingersticks 100-180  -hospitalist following    Heme/Onc:    -lovenox and SCDs for DVT prophylaxis    Renal:   -voiding    ID:   -afebrile    GI:   -consistent carbohydrate diet  -senna, miralax, and dulcolax for bowel regimen  -pepcid for gi prophylaxis      Spectra #[id number]

## 2020-08-21 NOTE — PROGRESS NOTE ADULT - PROBLEM SELECTOR PLAN 2
on metformin 1000mg BID at home.  - HbA1c 8.2%  - FS with suboptimal control in setting of steroids  - Endocrinology consulted and I see already making adjustments  - lantus 22 units qHS and pre-meal humalog 15 units TID qAC as per Endo  - mod dose sliding scale  - NSGY PA to clarify duration of steroid course upon discharge as patient may need another oral agent vs. temporary insulin regimen as outpatient until he finishes duration of steroids  - will need outpatient Endo follow-up

## 2020-08-21 NOTE — PROGRESS NOTE ADULT - SUBJECTIVE AND OBJECTIVE BOX
[facility] Division of Hospital Medicine  Dedra Irwin MD  Pager (M-F, 5Q-1Z): [phone]  Other Times:  [phone]      Patient is a 55y old  Male who presents with a chief complaint of spine mass (21 Aug 2020 11:00)      SUBJECTIVE / OVERNIGHT EVENTS: no acute events overnight. hemovac removed. feels well overall. pain well controlled. no fevers, chills, chest pain, SOB, abd pain, n/v/d/c, nor dysuria at this time. states the numbness in his lateral right thigh, left toe, and upper abd improving.    ADDITIONAL REVIEW OF SYSTEMS:    MEDICATIONS  (STANDING):  dexAMETHasone     Tablet 3 milliGRAM(s) Oral every 8 hours  dextrose 5%. 1000 milliLiter(s) (50 mL/Hr) IV Continuous <Continuous>  dextrose 50% Injectable 12.5 Gram(s) IV Push once  dextrose 50% Injectable 25 Gram(s) IV Push once  dextrose 50% Injectable 25 Gram(s) IV Push once  enoxaparin Injectable 40 milliGRAM(s) SubCutaneous <User Schedule>  famotidine Injectable 20 milliGRAM(s) IV Push two times a day  insulin glargine Injectable (LANTUS) 22 Unit(s) SubCutaneous at bedtime  insulin lispro (HumaLOG) corrective regimen sliding scale   SubCutaneous three times a day with meals  insulin lispro (HumaLOG) corrective regimen sliding scale   SubCutaneous at bedtime  insulin lispro Injectable (HumaLOG) 15 Unit(s) SubCutaneous three times a day before meals  lisinopril 5 milliGRAM(s) Oral daily  polyethylene glycol 3350 17 Gram(s) Oral two times a day  simvastatin 5 milliGRAM(s) Oral at bedtime    MEDICATIONS  (PRN):  acetaminophen   Tablet .. 650 milliGRAM(s) Oral every 6 hours PRN Temp greater or equal to 38.5C (101.3F), Mild Pain (1 - 3)  bisacodyl 5 milliGRAM(s) Oral daily PRN Constipation  dextrose 40% Gel 15 Gram(s) Oral once PRN Blood Glucose LESS THAN 70 milliGRAM(s)/deciliter  glucagon  Injectable 1 milliGRAM(s) IntraMuscular once PRN Glucose LESS THAN 70 milligrams/deciliter  oxyCODONE    IR 5 milliGRAM(s) Oral every 6 hours PRN Moderate Pain (4 - 6)  oxyCODONE    IR 10 milliGRAM(s) Oral every 6 hours PRN Severe Pain (7 - 10)  senna 2 Tablet(s) Oral at bedtime PRN Constipation      CAPILLARY BLOOD GLUCOSE      POCT Blood Glucose.: 270 mg/dL (21 Aug 2020 08:12)  POCT Blood Glucose.: 264 mg/dL (20 Aug 2020 21:24)  POCT Blood Glucose.: 220 mg/dL (20 Aug 2020 17:33)    I&O's Summary    20 Aug 2020 07:01  -  21 Aug 2020 07:00  --------------------------------------------------------  IN: 980 mL / OUT: 430 mL / NET: 550 mL    21 Aug 2020 07:01  -  21 Aug 2020 12:34  --------------------------------------------------------  IN: 600 mL / OUT: 300 mL / NET: 300 mL        PHYSICAL EXAM:  Vital Signs Last 24 Hrs  T(C): 36.8 (21 Aug 2020 08:00), Max: 36.9 (20 Aug 2020 15:44)  T(F): 98.2 (21 Aug 2020 08:00), Max: 98.4 (20 Aug 2020 15:44)  HR: 70 (21 Aug 2020 08:00) (68 - 81)  BP: 122/73 (21 Aug 2020 08:00) (108/64 - 144/66)  BP(mean): --  RR: 18 (21 Aug 2020 08:00) (18 - 18)  SpO2: 95% (21 Aug 2020 08:00) (95% - 98%)    CONSTITUTIONAL: NAD, well-developed, well-groomed  EYES: PERRLA; conjunctiva and sclera clear  ENMT: Moist oral mucosa, no pharyngeal injection or exudates; normal dentition  NECK: Supple, no palpable masses; no thyromegaly  RESPIRATORY: Normal respiratory effort; lungs are clear to auscultation bilaterally  CARDIOVASCULAR: Regular rate and rhythm, normal S1 and S2, no murmur/rub/gallop; No lower extremity edema; Peripheral pulses are 2+ bilaterally  ABDOMEN: Soft, Nondistended,  Nontender to palpation, normoactive bowel sounds  MUSCULOSKELETAL:  No clubbing or cyanosis of digits; no joint swelling or tenderness to palpation  PSYCH: A+O to person, place, and time; affect appropriate  NEUROLOGY: CN 2-12 are intact and symmetric; no gross sensory deficits, 5/5 strength throughout. +numbness L big toe, R thigh, upper abd  SKIN: incision site c/d/i, +now removed    LABS:                        13.7   12.76 )-----------( 248      ( 21 Aug 2020 06:45 )             41.3     08-21    133<L>  |  98  |  21  ----------------------------<  302<H>  4.6   |  25  |  0.71    Ca    9.5      21 Aug 2020 06:45      CAPILLARY BLOOD GLUCOSE      POCT Blood Glucose.: 270 mg/dL (21 Aug 2020 08:12)  POCT Blood Glucose.: 264 mg/dL (20 Aug 2020 21:24)  POCT Blood Glucose.: 220 mg/dL (20 Aug 2020 17:33)      RADIOLOGY & ADDITIONAL TESTS:  Results Reviewed: leukocytosis and H/H stable. mild hypoNa to 133. AM serum glucose 302. -270  Imaging Personally Reviewed:  Electrocardiogram Personally Reviewed:    COORDINATION OF CARE:  Care Discussed with Consultants/Other Providers [Y]: Neurosurgery PAOLO Machado  Prior or Outpatient Records Reviewed [Y/N]:

## 2020-08-21 NOTE — PROGRESS NOTE ADULT - ATTENDING COMMENTS
Pt DC home prior to my evaluation.
Pt doing well.  Headaches essentially resolved.  Has left great toe numbness  Continue PT.  DC planning, likely home tomorrow.
Pt doing well.  Left great toe and chest and right thigh numbness resolving.  DC hemovac  DC planning - home tomorrow
Pt doing well.  Normal strength. Some intermittent left great toe numbness.  Has headaches, were present pre-op. Do not appear to be positional.  Ambulated today in perez.    Post-op MRI demonstrates post-op changes without evidence of residual tumor.
Derda Irwin MD  Division of Hospital Medicine  [facility]   Spectra: [phone]

## 2020-08-21 NOTE — DISCHARGE NOTE PROVIDER - NSDCMRMEDTOKEN_GEN_ALL_CORE_FT
clotrimazole 1% topical lotion (obsolete): PRN  complex B: 1 tablet orally once a day   CoQ10: Coq10/ cinnamon - 1 tablet orally once a day   metFORMIN 1000 mg oral tablet: 1 tab(s) orally 2 times a day  multivitamin: 1 tablet orally once a day   ramipril 1.25 mg oral tablet: 1 tablet orally once a day in the evening   simvastatin 5 mg oral tablet: 1 tab(s) orally once a day (at bedtime)  Vitamin D3 2000 intl units (50 mcg) oral tablet: 1 tablet orally once a day   Zinc: 25 mg orally once a day clotrimazole 1% topical lotion (obsolete): PRN  complex B: 1 tablet orally once a day   CoQ10: Coq10/ cinnamon - 1 tablet orally once a day   metFORMIN 1000 mg oral tablet: 1 tab(s) orally 2 times a day  multivitamin: 1 tablet orally once a day   oxyCODONE 5 mg oral tablet: 1 tab(s) orally every 4 hours, As Needed -Moderate Pain (4 - 6) MDD:four tabs  ramipril 1.25 mg oral tablet: 1 tablet orally once a day in the evening   senna oral tablet: 2 tab(s) orally once a day (at bedtime), As needed, Constipation  simvastatin 5 mg oral tablet: 1 tab(s) orally once a day (at bedtime)  Vitamin D3 2000 intl units (50 mcg) oral tablet: 1 tablet orally once a day   Zinc: 25 mg orally once a day

## 2020-08-21 NOTE — CONSULT NOTE ADULT - PROBLEM SELECTOR RECOMMENDATION 2
on metformin 1000mg BID at home.  - HbA1c 8.2%  - FS with suboptimal control in setting of steroids  - start lantus 15 unit qHS - ordered  - increased premeal humalog to 9 units TID qAC, hold if NPO  - c/w mod dose sliding scale   - monitor FS qAC + qHS
c/w ramipril upon discharge

## 2020-08-21 NOTE — DISCHARGE NOTE PROVIDER - NSDCCPCAREPLAN_GEN_ALL_CORE_FT
PRINCIPAL DISCHARGE DIAGNOSIS  Diagnosis: Mass of spine  Assessment and Plan of Treatment: s/p t1 laminoplasty for meningioma on 8/17/2020  No strenuous activity.  No lifting.  No twisting or bending.  Do not return to work until cleared to do so by physician.  No driving until cleared to do so by physician.  keep incisions clean and dry. do not get incision wet until post op day #4. do not soak in water. no baths. pat dry. do not apply cream/moisture to incisions.  items for follow up: wound check, suture removal, final pathology  follow up with neurosurgery, Dr. Estevez, within 1 week of discharge, call [phone]  follow up with PMD within 1 week of discharge PRINCIPAL DISCHARGE DIAGNOSIS  Diagnosis: Mass of spine  Assessment and Plan of Treatment: s/p t1 laminoplasty for meningioma on 8/17/2020  No strenuous activity.  No lifting.  No twisting or bending.  Do not return to work until cleared to do so by physician.  No driving until cleared to do so by physician.  keep incisions clean and dry. do not get incision wet until post op day #4. do not soak in water. no baths. pat dry. do not apply cream/moisture to incisions.  items for follow up: wound check, suture removal  follow up with neurosurgery, Dr. Estevez, within 1 week of discharge, call [phone]  follow up with endocrinology, Dr. Shah, within 1 week of discharge, call [phone]  follow up with PMD within 1 week of discharge

## 2022-04-14 NOTE — PHYSICAL THERAPY INITIAL EVALUATION ADULT - BALANCE DISTURBANCE, IDENTIFIED IMPAIRMENT CONTRIBUTE, REHAB EVAL
2022    TELEHEALTH EVALUATION -- Audio/Visual (During  public health emergency)    Chief Complaint   Patient presents with    Congestion    Cough    Fatigue    Fever     High 103.3 Lowest 101.7    Influenza    Generalized Body Aches    Pharyngitis       HPI:    Ric Campbell (:  1974) has requested an audio/video evaluation for the following concern(s):    Patient felt fine yesterday morning then yesterday evening stated to feel \"washy\". Awoke with 103.3 fever and sweats/chills in the middle of the night which have now been followed with congestion, headache,cough, fatigue, body aches and sore throat. Came on all of the sudden. Does not know if exposed to like illness but is possibility. At this time she denies SOB, wheezing, no loss of taste or smell. Most likely influenza. Has coughed some clear mucus but not a lot. No blood to mucus. Has taken only Tylenol which has helped fever a little to decrease from 103 to 101. Has had COVID vaccine but not flu this year. Is tearful because she is not feeling well. She is instructed to see emergency care if she develops increased trouble breathing, chest pain, increased shortness of breath, new confusion, inability to stay awake, increased weakness, fever not responding to Tylenol, blue color to lips or face, etc. She verbalzies understanding. Review of Systems   Constitutional: Positive for activity change, chills, fatigue and fever. HENT: Positive for congestion, postnasal drip and sore throat. Respiratory: Positive for cough. Negative for chest tightness, shortness of breath and wheezing. Cardiovascular: Negative. Gastrointestinal: Negative. Genitourinary: Negative. Musculoskeletal: Positive for myalgias. Skin: Negative. Neurological: Positive for headaches. Psychiatric/Behavioral: Negative. Prior to Visit Medications    Medication Sig Taking?  Authorizing Provider   promethazine-dextromethorphan (PROMETHAZINE-DM) 6.25-15 MG/5ML syrup Take 5 mLs by mouth 4 times daily as needed for Cough Yes KELLY Feliz CNP   oseltamivir (TAMIFLU) 75 MG capsule Take 1 capsule by mouth 2 times daily for 5 days Yes KELLY Feliz CNP   benzonatate (TESSALON) 100 MG capsule Take 1-2 capsules by mouth 3 times daily as needed for Cough Yes KELLY Feliz CNP   QUEtiapine (SEROQUEL) 50 MG tablet TAKE 1 TABLET BY MOUTH NIGHTLY  KELLY Warren CNP   mometasone (ELOCON) 0.1 % cream APPLY TOPICALLY DAILY  KELLY Warren CNP   butalbital-acetaminophen-caffeine (FIORICET, ESGIC) -40 MG per tablet Take 1 tablet by mouth every 6 hours as needed for Headaches  KELLY Warren CNP   traZODone (DESYREL) 50 MG tablet TAKE 1 TABLET BY MOUTH NIGHTLY AS NEEDED FOR SLEEP  Lucía Lovelace MD   ALPRAZolam (XANAX) 1 MG tablet TAKE 1 TABLET BY MOUTH NIGHTLY AS NEEDED FOR SLEEP OR ANXIETY FOR UP TO 30 DAYS  Lucía Lovelace MD   pregabalin (LYRICA) 100 MG capsule TAKE 1 CAPSULE BY MOUTH 2 TIMES DAILY FOR 30 DAYS  Lucía Lovelace MD   alendronate (FOSAMAX) 70 MG tablet TAKE 1 TABLET BY MOUTH EVERY 7 DAYS IN THE MORNING ATLEAST 30MINUTES PRIOR TO MEAL,BEVERAGE OR MED OF THE DAY TAKE WITH A FULL GLASS OF PLAIN WATER  Lucía Lovelace MD   rOPINIRole (REQUIP) 0.5 MG tablet TAKE 1 TABLET BY MOUTH ONCE NIGHTLY 1-2 HOURS BEFORE BEDTIME  KELLY Blake CNP   pantoprazole (PROTONIX) 40 MG tablet Take 1 tablet by mouth 2 times daily  KELLY Menard CNP   estradiol (ESTRACE) 1 MG tablet Take by mouth  Historical Provider, MD   ACETAMINOPHEN EXTRA STRENGTH 500 MG tablet TAKE 1 TABLET BY MOUTH EVERY 6 HOURS AS NEEDED  Historical Provider, MD   furosemide (LASIX) 20 MG tablet Take 1 tablet by mouth daily  CODY Yip   lidocaine-prilocaine (EMLA) 2.5-2.5 % cream Apply topically as needed.   CODY Yip   Estradiol (IMVEXXY STARTER PACK) 4 MCG INST Place vaginally once a week Indications: given to by OBGYN  Historical Provider, MD   Biotin 300 MCG TABS Take 1 tablet by mouth  Historical Provider, MD   Multiple Vitamins-Minerals (THERAPEUTIC MULTIVITAMIN-MINERALS) tablet Take 1 tablet by mouth daily  Historical Provider, MD   estropipate (OGEN) 1.5 MG tablet TAKE ONE TABLET BY MOUTH EVERY DAY  Miri Reagan,    Elastic Bandages & Supports (FUTURO SHEER SUPPORT HOSE) MISC 10mm Hg support hose, waist high. Esperanza Etienne MD   calcium carbonate 600 MG TABS tablet   Take 1 tablet by mouth once a week   Historical Provider, MD   vitamin D 1000 UNITS CAPS Take  by mouth. Historical Provider, MD       Social History     Tobacco Use    Smoking status: Current Every Day Smoker     Packs/day: 0.50     Years: 20.00     Pack years: 10.00     Types: Cigarettes     Last attempt to quit: 2012     Years since quittin.9    Smokeless tobacco: Never Used   Vaping Use    Vaping Use: Never used   Substance Use Topics    Alcohol use: No    Drug use: No        Allergies   Allergen Reactions    Latex Other (See Comments)     Only reaction to band aids    Iodides Anaphylaxis     Both IV and local    Cymbalta [Duloxetine Hcl] Other (See Comments)     \"made me cry\"  Mood changes    Duloxetine     Adhesive Tape Rash   ,   Past Medical History:   Diagnosis Date    Anxiety 3/6/2013    Chronic pain     due to adhesions, Dr. Meme Herrmann  pain Management [phone], Dr. Jennifer Webster Diverticulitis     DVT (deep vein thrombosis) in pregnancy     Gastroesophageal reflux disease 10/19/2015    Hyperlipidemia 10/19/2015    Kidney stones, calcium oxalate 2006    PE (pulmonary embolism)     Sleep apnea     Venous insufficiency    ,   Past Surgical History:   Procedure Laterality Date    ABDOMINAL ADHESION SURGERY      COSMETIC SURGERY      face and lip as child electrocuted    DILATION AND CURETTAGE OF UTERUS      ?  times    ECTOPIC PREGNANCY SURGERY      x7    ENDOMETRIAL BIOPSY  FRACTURE SURGERY      orif left 5th metatarsal    HYSTERECTOMY      HYSTEROSCOPY      KIDNEY STONE SURGERY      PELVIC LAPAROSCOPY      UPPER GASTROINTESTINAL ENDOSCOPY N/A 2020    EGD W/ANES.  performed by Evie Pham MD at [facility]     ,   Social History     Tobacco Use    Smoking status: Current Every Day Smoker     Packs/day: 0.50     Years: 20.00     Pack years: 10.00     Types: Cigarettes     Last attempt to quit: 2012     Years since quittin.9    Smokeless tobacco: Never Used   Vaping Use    Vaping Use: Never used   Substance Use Topics    Alcohol use: No    Drug use: No   ,   Family History   Problem Relation Age of Onset   [facility] Migraines Mother     Hypertension Mother     High Cholesterol Mother     Anemia Mother     Bleeding Prob Mother     Diabetes Mother     Anxiety Disorder Mother     Kidney Disease Mother     Thyroid Disease Mother     High Cholesterol Father     Hypertension Father     Hypertension Brother    ,   Immunization History   Administered Date(s) Administered    COVID-19, Pfizer Purple top, DILUTE for use, 12+ yrs, 30mcg/0.3mL dose 2021, 2021    Influenza Virus Vaccine 2014, 10/19/2015, 10/01/2019    Influenza, Quadv, IM, (6 mo and older Fluzone, Flulaval, Fluarix and 3 yrs and older Afluria) 2016, 2017, 10/22/2020    Influenza, Azalee Fees, IM, PF (6 mo and older Fluzone, Flulaval, Fluarix, and 3 yrs and older Afluria) 2017, 10/21/2019, 10/22/2020    Pneumococcal Conjugate Vaccine 10/01/2019    Pneumococcal Polysaccharide ([id number]) 2016, 10/21/2019       PHYSICAL EXAMINATION:  [ INSTRUCTIONS:  \"[x]\" Indicates a positive item  \"[]\" Indicates a negative item  -- DELETE ALL ITEMS NOT EXAMINED]  Vital Signs: (As obtained by patient/caregiver or practitioner observation)    Blood pressure-  Heart rate-    Respiratory rate-    Temperature-  Pulse oximetry- Constitutional: [x] Appears well-developed and well-nourished [x] No apparent distress      [x] Abnormal- appears ill and tearful. Mental status  [x] Alert and awake  [x] Oriented to person/place/time [x]Able to follow commands      Eyes:  EOM    [x]  Normal  [] Abnormal-  Sclera  [x]  Normal  [] Abnormal -         Discharge [x]  None visible  [] Abnormal -    HENT:   [x] Normocephalic, atraumatic. [] Abnormal   [] Mouth/Throat: Mucous membranes are moist.     External Ears [x] Normal  [] Abnormal-     Neck: [x] No visualized mass     Pulmonary/Chest: [x] Respiratory effort normal.  [x] No visualized signs of difficulty breathing or respiratory distress        [] Abnormal-      Musculoskeletal:   [] Normal gait with no signs of ataxia         [x] Normal range of motion of neck        [] Abnormal-       Neurological:        [x] No Facial Asymmetry (Cranial nerve 7 motor function) (limited exam to video visit)          [x] No gaze palsy        [] Abnormal-         Skin:        [x] No significant exanthematous lesions or discoloration noted on facial skin         [] Abnormal-            Psychiatric:       [x] Normal Affect [] No Hallucinations        [] Abnormal-     Other pertinent observable physical exam findings-     ASSESSMENT/PLAN:  1. Flu  Notify office if you do not improve or have worsening of condition. Take medication as prescribed. Drink plenty of fluids and rest.  Practice good hand hygiene. May sleep with humidifier as needed. Gargle with warm salt water 3-4 times a day to help with sore throat. You can use ice, warm chicken noodle soup, soft foods, popsicles and cough drops to help soothe your throat. May take Tylenol/Ibuprofen (alternate) as needed for fever/pain. Please refer to educational handout provided with AVS.    - oseltamivir (TAMIFLU) 75 MG capsule; Take 1 capsule by mouth 2 times daily for 5 days  Dispense: 10 capsule; Refill: 0    2.  Cough  Notify office if you have no improvement or worsening of condition. Drink lots of water and other fluids. This helps thin the mucus and soothes a dry or sore throat. Honey or lemon juice in hot water or tea may ease a dry cough. Take cough medicine as directed by your doctor. Prop up your head on pillows to help you breathe and ease a dry cough. Try cough drops or hard candy to soothe a dry or sore throat. Do not smoke. Avoid secondhand smoke. Please refer to educational handout provided with AVS.    - promethazine-dextromethorphan (PROMETHAZINE-DM) 6.25-15 MG/5ML syrup; Take 5 mLs by mouth 4 times daily as needed for Cough  Dispense: 118 mL; Refill: 0  - benzonatate (TESSALON) 100 MG capsule; Take 1-2 capsules by mouth 3 times daily as needed for Cough  Dispense: 30 capsule; Refill: 0      Return if symptoms worsen or fail to improve. Latrell Baca, was evaluated through a synchronous (real-time) audio-video encounter. The patient (or guardian if applicable) is aware that this is a billable service, which includes applicable co-pays. This Virtual Visit was conducted with patient's (and/or legal guardian's) consent. The visit was conducted pursuant to the emergency declaration under the [address] [place] [address], [address] [place] [address] authority and the [employer] Act. Patient identification was verified, and a caregiver was present when appropriate. The patient was located at home in a state where the provider was licensed to provide care. Total time spent on this encounter: 29 minutes    --KELLY Hinton CNP on 4/14/2022 at 6:00 PM    An electronic signature was used to authenticate this note. decreased strength

## 2022-05-19 NOTE — PRE-OP CHECKLIST - NS PREOP CHK HIBICLENS NA
[facility]  [facility]    Patient Name: Tianna Leon  MRN: [id number]  Patient Class: IP- Inpatient   Admission Date: 5/6/2022  Length of Stay: 13 days  Attending Physician: Columba Helms MD  Primary Care Provider: Spenser Campa MD        Subjective:     Principal Problem:Bacteremia        HPI:  38 y/o. female  with a PMHx of asthma, COPD,Bipolar  , IVDU and HLD presented to the ER with a c/o  sob for the last weak which has gotten worse . The SOB is associated with fever , chills , productive cough , back pain  and generalized weakness . She report cough some blood this am .  She is active IVDU  ( heroine , cocaine  and amphetamine ) . She denies any  sick contact , chest pain  , GI/ sx , She also complaning of LE sweeling . Knee pain and B/L LE rash .   ER COURSE: CTA chest negative for pe . Multiple nodular and cavitary opacities concerning for septic emboli with larger opacities possibly necrotizing pneumonia. CT cervical  and thoracic did not show any acute finding , CT lumbar Possible progression of degenerative changes most notable at L4-5 with moderate to severe spinal canal stenosis at this level.  WBC  29 k , na 130 , k 3.4 , cl 89 , procal 2.71   ER VS:  BP Pulse Resp Temp SpO2   (!) 124/58 110 (!) 30 (!) 101.6 °F (38.7 °C) 96 %           Pt will be admitted to inpt with a dx of PNA and severe sepsis       Overview/Hospital Course:  5/7 admitted for pneumonia, severe sepsis in setting of ivdu. Mother at bedside and provides collateral. Patient has struggled with substance abuse for approximately 20 years, worsening over last 3-5 years since life event. Onset of symptoms 1-2 weeks ago. Tolerated thoracentesis with no pneumothorax on chest x-ray. Mri lumbar and echo, pending. Bcx positive, growing gram positive cocci. On vanc, cefepime and flagyl. Infectious disease consulted for bacteremia. 5/8/22 The patient remained afebrile overnight. Currently on  cefepime/vanc/flagyl. Blood cx are growing staph aureus. The patient refused the MRI lumbar spine overnight d/t being unable to lay flat from back pain. Will give pain meds and attempt to get the MRI today to r/o spinal abscess. The ECHO showed a 1.3 x 1.5 cm elongated, mobile echodensity seen on the tricuspid valve consistent with vegetation, CT surgery was consulted. Will need a SUSANNAH. Infectious disease is consulted. Will repeat blood cx today. 5/9/22 No acute events overnight. The patient reports lower back pain is not well controlled with current pain regimen, will adjust. Repeat blood cx are still positive. Sensitivities are back and Staph is sensitive to oxacillin, will D/C vanc/cefepime. Cardiology consulted and plans for a SUSANNAH today. CT surgery following and recommends continuing medical management with appropriate ABX, no surgical intervention at this time. MRI lumbar spine showed moderate to severe spinal canal stenosis with moderate left-sided neural foraminal stenosis, Neurosurgery consulted. 5/10/22 No acute events overnight. The patient reports some improvement in pain with adjustment in pain meds. Repeat blood cx are +. Continue IV oxacillin and flagyl. Infectious disease is consulted. IR consulted to evaluate possible paraspinous muscle myositis versus abscess. Recommend IR drainage. Continue current management. 5/11/22 No acute events overnight. The patient reports pain is still not well controlled at times. The patient reports that she is very anxious about the upcoming SUSANNAH and IR drainage. SUSANNAH was pushed back to this afternoon because the patient ate candy this AM. Continue treatment with IV oxacillin and flagyl. Infectious disease is following. WBC trended down to 31K. Continue current management. 5/12/22 No acute events overnight. The patients WBC improved to 22K overnight. The patient remains on continuous oxacillin infusion. Infectious disease is following. MRI thoracic spine is ordered and  pending. The patients SUSANNAH was postponed again today d/t low H/H. Hgb was noted to be 6.2 this morning, will transfuse 1 unit PRBC. The patient reports pain is still not well controlled. The case was discussed with Neurosurgery who recommended adding extended release pain meds. Approximately 1 ml of purulent drainage aspirated from back yesterday per IR, growing gram + cocci.     5/13: Patient was given 1U PRBC on 5/12, improved from 6.2 --> 7.0, will give an additional unit PRBC. Radiology attempted to bring patient on 5/12 at 1755 for Thoracic MRI, patient refused due to pain and anxiety. Repeat attempt this AM, patient again refused. Will order Ativan 1mg IV to be given with pain medication prior to scan to relieve anxiety and pain, nurse to notify NP when ready to go for scan. Discussed pain management with patient, discussed transition to PO pain medications to being preparing for d/c to LTAC, adjusted PO pain regimen will reevaluate to determine effectiveness. Repeat Blood cultures: NGTD, sensitivities pending.     5/14: Final anaerobic cultures pending, continue to adjust pain regimen, d/c IV Dilaudid today. Patient currently managed with PO Morphine extended release and PO oxycodone. D/C plan is for LTAC once final antibiotic regimen determined. Patient continues to refuse MRI of thoracic spine.     5/15: Called to room this AM, patient had coughed up blood and mucous. Approx. 1-2 teaspoons of blood in emesis bag, ordered CXR and repeat CBC, CXR showed improvement from previous day, repeat CBC at time of incident, showed a slight decrease in Hgb: 9.7 --> 8.7, when repeated at 1500, Hgb had improved to 10. no further episodes of coughing up blood throughout day. Patient continues to c/o not having enough pain medication but continues to sleep most of the day and will fall asleep when speaking at times. Added IV Toradol to plan.     5/16: No further episodes of coughing blood overnight, patient participated  with encouragement with PT/OT today, recommendations are  vs. SNF pending progress. Awaiting final antibiotic regimen recommendations. Anaerobic culture results finalized: negative for growth. Afebrile, WBC trending downward.     5/17: Antibiotic regimen per ID is continuous oxacillin until 7/11/22, SW notified of plan, will seek acceptance at LTAC or SNF. Patient choice is [facility]. WBC in normal range. AMS this AM, CT of head: no acute findings. Given narcan and mental status improved. Decreased narcotic dose.     5/18: Per discussion with patient, she is willing to have the MRI of thoracic spine today, will order IV pain medication and anxiety medication prior to scan. Nursing and Radiology aware of needing to premedicate for scan. Patient is more cooperative and participating with care.     5/19: Patient given premedications for MRI, MRI completed, results pending. Patient more agreeable with treatment plan and cooperating. Will need psychiatry follow-up after discharge. Hgb: 6.7, will give 1U PRBC      Interval History: MRI completed, results pending. Awaiting acceptance for [facility].     Review of Systems   Constitutional:  Positive for activity change, appetite change and fatigue. Negative for chills, diaphoresis, fever and unexpected weight change.   HENT:  Negative for congestion, hearing loss, mouth sores, postnasal drip, rhinorrhea, sore throat and trouble swallowing.    Eyes:  Negative for discharge and visual disturbance.   Respiratory:  Negative for cough, chest tightness and shortness of breath.    Cardiovascular:  Negative for chest pain, palpitations and leg swelling.   Gastrointestinal:  Positive for abdominal distention and abdominal pain. Negative for blood in stool, constipation, diarrhea, nausea and vomiting.   Endocrine: Negative for cold intolerance and heat intolerance.   Genitourinary:  Negative for difficulty urinating, dyspareunia, flank pain and hematuria.   Musculoskeletal:  Positive for back  pain and myalgias. Negative for arthralgias.   Skin: Negative.    Neurological:  Positive for weakness and headaches. Negative for dizziness and light-headedness.   Hematological:  Negative for adenopathy. Does not bruise/bleed easily.   Psychiatric/Behavioral:  Negative for agitation, behavioral problems, confusion and sleep disturbance. The patient is nervous/anxious.    Objective:     Vital Signs (Most Recent):  Temp: 99.9 °F (37.7 °C) (05/19/22 1217)  Pulse: 94 (05/19/22 1217)  Resp: 18 (05/19/22 1217)  BP: (!) 143/69 (05/19/22 1217)  SpO2: 95 % (05/19/22 1217)   Vital Signs (24h Range):  Temp:  [97.7 °F (36.5 °C)-99.9 °F (37.7 °C)] 99.9 °F (37.7 °C)  Pulse:  [77-99] 94  Resp:  [18-19] 18  SpO2:  [95 %-100 %] 95 %  BP: (101-143)/(55-69) 143/69     Weight: 95.1 kg (209 lb 10.5 oz)  Body mass index is 34.89 kg/m².    Intake/Output Summary (Last 24 hours) at 5/19/2022 1235  Last data filed at 5/19/2022 0731  Gross per 24 hour   Intake 535.12 ml   Output 1200 ml   Net -664.88 ml      Physical Exam  Vitals and nursing note reviewed.   Constitutional:       General: She is not in acute distress.     Appearance: She is well-developed. She is ill-appearing.   HENT:      Head: Normocephalic and atraumatic.      Right Ear: Hearing and external ear normal.      Left Ear: Hearing and external ear normal.      Nose: No rhinorrhea.      Right Sinus: No maxillary sinus tenderness or frontal sinus tenderness.      Left Sinus: No maxillary sinus tenderness or frontal sinus tenderness.      Mouth/Throat:      Mouth: No oral lesions.      Pharynx: Uvula midline.   Eyes:      General:         Right eye: No discharge.         Left eye: No discharge.      Conjunctiva/sclera: Conjunctivae normal.      Pupils: Pupils are equal, round, and reactive to light.   Neck:      Thyroid: No thyromegaly.      Vascular: No carotid bruit.      Trachea: No tracheal deviation.   Cardiovascular:      Rate and Rhythm: Regular rhythm. Tachycardia  present.      Pulses:           Dorsalis pedis pulses are 2+ on the right side and 2+ on the left side.      Heart sounds: Normal heart sounds, S1 normal and S2 normal. No murmur heard.  Pulmonary:      Effort: Pulmonary effort is normal. No respiratory distress.      Breath sounds: Examination of the right-lower field reveals decreased breath sounds. Examination of the left-lower field reveals decreased breath sounds. Decreased breath sounds present.   Chest:   Breasts:      Right: No supraclavicular adenopathy.      Left: No supraclavicular adenopathy.     Abdominal:      General: Bowel sounds are decreased. There is no distension.      Palpations: Abdomen is soft. There is no mass.      Tenderness: There is no abdominal tenderness.   Musculoskeletal:      Cervical back: Normal range of motion.      Thoracic back: Tenderness present. Decreased range of motion.      Lumbar back: Tenderness present. Decreased range of motion.   Lymphadenopathy:      Cervical: No cervical adenopathy.      Upper Body:      Right upper body: No supraclavicular adenopathy.      Left upper body: No supraclavicular adenopathy.   Skin:     General: Skin is warm and dry.      Capillary Refill: Capillary refill takes less than 2 seconds.      Findings: No rash.   Neurological:      Mental Status: She is alert and oriented to person, place, and time.      Sensory: No sensory deficit.      Coordination: Coordination normal.      Gait: Gait normal.   Psychiatric:         Mood and Affect: Mood is not anxious or depressed.         Speech: Speech normal.         Behavior: Behavior normal.         Thought Content: Thought content normal.         Judgment: Judgment normal.       Significant Labs: All pertinent labs within the past 24 hours have been reviewed.  CBC:   Recent Labs   Lab 05/18/22 0522 05/19/22  0527   WBC 9.91 9.92   HGB 7.0* 6.7*   HCT 24.6* 21.3*   * 489*     CMP:   Recent Labs   Lab 05/18/22 0522      K 5.0   CL 97    CO2 27   GLU 47*   BUN 15   CREATININE 0.6   CALCIUM 7.6*   PROT 5.2*   ALBUMIN 1.3*   BILITOT 0.2   ALKPHOS 184*   AST 24   ALT 23   ANIONGAP 13   EGFRNONAA >60       Significant Imaging: I have reviewed all pertinent imaging results/findings within the past 24 hours.      Assessment/Plan:      * Bacteremia  Gram positive   Infectious disease consulted  Continue broad spectrum intravenous antibiotic(s) given h/o ivdu  Remains febrile and leukocytosis persists  5/8/22 The patient remained afebrile overnight. Currently on cefepime/vanc/flagyl. Blood cx are growing staph aureus. The patient refused the MRI lumbar spine overnight d/t being unable to lay flat from back pain. Will give pain meds and attempt to get the MRI today to r/o spinal abscess. The ECHO showed a 1.3 x 1.5 cm elongated, mobile echodensity seen on the tricuspid valve consistent with vegetation, CT surgery was consulted. Will need a SUSANNAH. Infectious disease is consulted. Will repeat blood cx today.   5/9/22 Repeat blood cx are still positive. Sensitivities are back and Staph is sensitive to oxacillin, will D/C vanc/cefepime. Infectious disease is following   5/10/22 Repeat blood cx are +. Continue IV oxacillin and flagyl. Infectious disease is consulted. IR consulted to evaluate possible paraspinous muscle myositis versus abscess. Recommend IR drainage. Continue current management.    5/11/22 The patient reports pain is still not well controlled at times. The patient reports that she is very anxious about the upcoming SUSANNAH and IR drainage. SUSANNAH was pushed back to this afternoon because the patient ate candy this AM. Continue treatment with IV oxacillin and flagyl. Infectious disease is following. WBC trended down to 31K. Continue current management.  5/12/22 The patients WBC improved to 22K overnight. The patient remains on continuous oxacillin infusion. Infectious disease is following.  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 IR consulted to evaluate possible paraspinous muscle myositis versus abscess.    --Approximately 1 ml of purulent drainage aspirated from back 5/11 per IR, growing gram + cocci.    --Continue current regimen      Lumbar stenosis without neurogenic claudication        Subacute bacterial endocarditis        Vegetation of heart valve  ECHO showed a 1.3 x 1.5 cm elongated, mobile echodensity seen on the tricuspid valve consistent with vegetation, CT surgery was consulted.     --Will need a SUSANNAH.  -- Infectious disease is consulted.  -- repeat blood cx NGTD.  -- Continue IV ABX.   --Sensitivities are back and Staph is sensitive to oxacillin, D/C vanc/cefepime.   --Cardiology consulted and plans for a SUSANNAH.   --CT surgery following and recommends continuing medical management with appropriate ABX, no surgical intervention at this time.      Chronic pulmonary heart disease  - Pulmonology following       Parapneumonic effusion  Pulmonology consulted  Status post thoracentesis  Fluid studies pending  5/11/22 IR to collect sample and send for culture today- reviewed    IVDU (intravenous drug user)   Will ordet TTE to r/o IE  Will order MRI lumbar wwo- completed   Cont broad spectrum IVAB   --MRI thoracic spine ordered per ID, patient refused on multiple attempts    5/7   Studies pending for additional sites of infection given h/o ivdu  5/8/22  on cessation     Severe sepsis  This patient does have evidence of infective focus  My overall impression is sepsis. Vital signs were reviewed and noted in progress note.  Antibiotics given-    Antibiotics (From admission, onward)            Start     Stop Route Frequency Ordered    05/09/22 1100  oxacillin 12 g in  mL CONTINUOUS INFUSION         -- IV Every 24 hours (non-standard times) 05/09/22 0932    05/07/22 2100  mupirocin 2 % ointment         05/12 2059 Nasl 2 times daily 05/07/22 1646        Cultures were taken-   Microbiology Results (last 7 days)     Procedure Component Value Units Date/Time    Culture, Respiratory with Gram Stain [[id number]] Collected: 05/16/22 0758    Order Status: Sent Specimen: Respiratory from Sputum Updated: 05/16/22 0759    Culture, Anaerobe [[id number]] Collected: 05/11/22 1440    Order Status: Completed Specimen: Abscess from Back Updated: 05/16/22 0733     Anaerobic Culture No anaerobes isolated    Blood culture [[id number]] Collected: 05/11/22 1213    Order Status: Completed Specimen: Blood Updated: 05/16/22 0612     Blood Culture, Routine No Growth to date      No Growth to date      No Growth to date      No Growth to date      No Growth to date    Blood culture [[id number]] Collected: 05/11/22 1213    Order Status: Completed Specimen: Blood Updated: 05/16/22 0612     Blood Culture, Routine No Growth to date      No Growth to date      No Growth to date      No Growth to date      No Growth to date    Aerobic culture [[id number]]  (Abnormal)  (Susceptibility) Collected: 05/11/22 1440    Order Status: Completed Specimen: Abscess from Back Updated: 05/14/22 1057     Aerobic Bacterial Culture STAPHYLOCOCCUS AUREUS  Many      Gram stain [[id number]] Collected: 05/11/22 1440    Order Status: Completed Specimen: Abscess from Back Updated: 05/12/22 0306     Gram Stain Result Few WBC's      Few Gram positive cocci    Gram stain [[id number]]     Order Status: Canceled Specimen: Joint Fluid from Back     Blood culture [[id number]]  (Abnormal) Collected: 05/08/22 1516    Order Status: Completed Specimen: Blood from Peripheral, Right Hand Updated: 05/11/22 1009     Blood  Culture, Routine Gram stain aer bottle: Gram positive cocci in clusters resembling Staph       Results called to and read back by: Chasity Raymundo RN  05/09/2022  11:31      STAPHYLOCOCCUS AUREUS  [facility]  For susceptibility see order #[id number]      Narrative:      Collection has been rescheduled by [email or web address] at 05/08/2022 13:22 Reason:   Pt in mri will be there after another hour [id number]  Collection has been rescheduled by [email or web address] at 05/08/2022 13:22 Reason:   Pt in mri will be there after another hour [id number]    Blood culture [[id number]]  (Abnormal) Collected: 05/08/22 1515    Order Status: Completed Specimen: Blood from Peripheral, Left Arm Updated: 05/11/22 1009     Blood Culture, Routine Gram stain aer bottle: Gram positive cocci in clusters resembling Staph      Results called to and read back by: Chasity Raymundo RN  05/09/2022  15:40      STAPHYLOCOCCUS AUREUS  [facility].  For susceptibility see order #[id number]      Narrative:      Collection has been rescheduled by [email or web address] at 05/08/2022 13:22 Reason:   Pt in mri will be there after another hour [id number]  Collection has been rescheduled by [email or web address] at 05/08/2022 13:22 Reason:   Pt in mri will be there after another hour [id number]        Latest lactate reviewed, they are-  No results for input(s): LACTATE in the last 72 hours.    Organ dysfunction indicated by Acute kidney injury  Source-  lung    Source control Achieved by-   Cont Broad spectrum IVAB     Bacteremia  Infectious disease consulted      PNA (pneumonia)  H/O IVDU   --Cont oxacillin  --F/U blood and sputum cx   --Status post thoracentesis - 5/7  --Chest x-ray without signs of pneumothorax  --Fluid studies pending  --C/O worsened cough, repeat CXR shows interval worsening, likely secondary to IVF  --Stopped IVF, given Lasix 40mg IV x2 dose- improved  --Nebs PRN  --Supportive O2        Tobacco abuse  -  Patient thoroughly counseled on smoking cessation, pt verbalized understanding. Time of counseling 10 minutes.        COPD (chronic obstructive pulmonary disease)  Cont breathing tx prn  Pulmonology following     Lower back pain  Chronic in nature, H/O IVDU  --CT lumbar show spinal stenosis   --No neurological deficit at this time   --MRI lumbar wwo  To r/o spinal abscess -showed moderate to severe spinal canal stenosis with moderate left-sided neural foraminal stenosis, Neurosurgery consulted.     --MRI thoracic spine ordered: completed 5/19/22, results pending  --Decreased dose of pain medication due to patient drowsy and speech slurred. Decreased Oxycodone to 5mg Q6h, Morphine 12hr tab 15mg Q12  --Continue Toradol- will change to PO      VTE Risk Mitigation (From admission, onward)         Ordered     enoxaparin injection 40 mg  Daily         05/06/22 2336     IP VTE HIGH RISK PATIENT  Once         05/06/22 2336     Place sequential compression device  Until discontinued         05/06/22 2336                Discharge Planning   YESSICA:      Code Status: Full Code   Is the patient medically ready for discharge?:     Reason for patient still in hospital (select all that apply): Patient trending condition  Discharge Plan A: Home   Discharge Delays: None known at this time              Marjan Calderón NP  Department of Hospital Medicine   [facility] - Med Surg   #1:

## 2023-03-23 NOTE — DIETITIAN INITIAL EVALUATION ADULT. - NUTRITION DIAGNOSITC TERMINOLOGY #1
GENERAL SURGERY  DAILY PROGRESS NOTE  3/23/2023  Chief Complaint   Patient presents with    Abdominal Pain     Transfer from [facility] for acute appendicitis and pulmonary edema concern for lymphoma       Subjective:  Abdominal pain completely resolved. No nausea no vomiting. Patient did have a small amount of hemoptysis this morning. Objective:  /68   Pulse 79   Temp 97.4 °F (36.3 °C) (Temporal)   Resp 17   Ht 5' 11\" (1.803 m)   Wt 165 lb (74.8 kg)   SpO2 96%   BMI 23.01 kg/m²     GENERAL:  Laying in bed, awake, alert, cooperative, no apparent distress  HEAD: Normocephalic, atraumatic  EYES: No sclera icterus, pupils equal  LUNGS:  No increased work of breathing, no lung sounds left side, hemoptysis  CARDIOVASCULAR:  regular rate  ABDOMEN:  Soft, nontender, no peritoneal signs, no distention  EXTREMITIES: No edema or swelling  SKIN: Warm and dry, no rashes or lesions    Assessment/Plan:  21 y.o. male with signs and symptoms consistent with acute appendicitis. Patient noted to have a very large left-sided pleural effusion with pericardial effusion. Continue nonoperative management of acute appendicitis with IV antibiotics  CT surgery consulted  Chest x-ray this morning pending  We will follow    Electronically signed by Freda Alaniz MD on 3/23/2023 at 7:32 AM      Attending Attestation   I saw and examined the patient, I agree with resident note      Hx taken from patient    I personally reviewed the imaging from IR and I spoke with IR doctor Dillon Henley, unable to do thoracentesis yesterday,       CT surgery consulted I personally reviewed their note, VATS possible pericardial window. Medicine consult placed for transfer of services, appreciate their taking him    Acute appendicitis abd pain almost all gone, would consolidate to PO Augmentin for total of 10 days of abx treatment but given chest pathology I will defer to admitting service for abx choices.     I will s/o please don't hesitate to call with any issues      North Vogel MD FACS Overweight/Obesity

## 2025-06-06 PROBLEM — Z86.39 PERSONAL HISTORY OF OTHER ENDOCRINE, NUTRITIONAL AND METABOLIC DISEASE: Chronic | Status: ACTIVE | Noted: 2020-08-05

## 2025-06-06 PROBLEM — Z86.69 PERSONAL HISTORY OF OTHER DISEASES OF THE NERVOUS SYSTEM AND SENSE ORGANS: Chronic | Status: ACTIVE | Noted: 2020-08-05

## 2025-06-06 PROBLEM — K58.9 IRRITABLE BOWEL SYNDROME, UNSPECIFIED: Chronic | Status: ACTIVE | Noted: 2020-08-05

## 2025-06-06 PROBLEM — E78.5 HYPERLIPIDEMIA, UNSPECIFIED: Chronic | Status: ACTIVE | Noted: 2020-08-05

## 2025-06-06 PROBLEM — Z87.19 PERSONAL HISTORY OF OTHER DISEASES OF THE DIGESTIVE SYSTEM: Chronic | Status: ACTIVE | Noted: 2020-08-05

## 2025-06-06 PROBLEM — R06.83 SNORING: Chronic | Status: ACTIVE | Noted: 2020-08-05

## 2025-06-06 PROBLEM — S14.9XXA INJURY OF UNSPECIFIED NERVES OF NECK, INITIAL ENCOUNTER: Chronic | Status: ACTIVE | Noted: 2020-08-05

## 2025-06-06 PROBLEM — K76.0 FATTY (CHANGE OF) LIVER, NOT ELSEWHERE CLASSIFIED: Chronic | Status: ACTIVE | Noted: 2020-08-05

## 2025-06-06 PROBLEM — E11.9 TYPE 2 DIABETES MELLITUS WITHOUT COMPLICATIONS: Chronic | Status: ACTIVE | Noted: 2020-08-05

## 2025-06-06 PROBLEM — M54.9 DORSALGIA, UNSPECIFIED: Chronic | Status: ACTIVE | Noted: 2020-08-05

## 2025-06-06 PROBLEM — M51.9 UNSPECIFIED THORACIC, THORACOLUMBAR AND LUMBOSACRAL INTERVERTEBRAL DISC DISORDER: Chronic | Status: ACTIVE | Noted: 2020-08-05

## 2025-06-06 PROBLEM — Z86.19 PERSONAL HISTORY OF OTHER INFECTIOUS AND PARASITIC DISEASES: Chronic | Status: ACTIVE | Noted: 2020-08-05

## 2025-07-26 ENCOUNTER — NON-APPOINTMENT (OUTPATIENT)
Age: 60
End: 2025-07-26

## 2025-07-28 ENCOUNTER — APPOINTMENT (OUTPATIENT)
Dept: CARDIOLOGY | Facility: CLINIC | Age: 60
End: 2025-07-28
Payer: MEDICAID

## 2025-07-28 VITALS — SYSTOLIC BLOOD PRESSURE: 126 MMHG | DIASTOLIC BLOOD PRESSURE: 64 MMHG

## 2025-07-28 VITALS
HEIGHT: 66 IN | SYSTOLIC BLOOD PRESSURE: 144 MMHG | OXYGEN SATURATION: 96 % | BODY MASS INDEX: 42.27 KG/M2 | WEIGHT: 263 LBS | HEART RATE: 88 BPM | DIASTOLIC BLOOD PRESSURE: 82 MMHG

## 2025-07-28 DIAGNOSIS — E66.9 OBESITY, UNSPECIFIED: ICD-10-CM

## 2025-07-28 DIAGNOSIS — R94.31 ABNORMAL ELECTROCARDIOGRAM [ECG] [EKG]: ICD-10-CM

## 2025-07-28 DIAGNOSIS — Z78.9 OTHER SPECIFIED HEALTH STATUS: ICD-10-CM

## 2025-07-28 DIAGNOSIS — I25.10 ATHEROSCLEROTIC HEART DISEASE OF NATIVE CORONARY ARTERY W/OUT ANGINA PECTORIS: ICD-10-CM

## 2025-07-28 DIAGNOSIS — I10 ESSENTIAL (PRIMARY) HYPERTENSION: ICD-10-CM

## 2025-07-28 DIAGNOSIS — F17.290 NICOTINE DEPENDENCE, OTHER TOBACCO PRODUCT, UNCOMPLICATED: ICD-10-CM

## 2025-07-28 DIAGNOSIS — E78.5 HYPERLIPIDEMIA, UNSPECIFIED: ICD-10-CM

## 2025-07-28 DIAGNOSIS — E11.9 TYPE 2 DIABETES MELLITUS W/OUT COMPLICATIONS: ICD-10-CM

## 2025-07-28 PROCEDURE — 99205 OFFICE O/P NEW HI 60 MIN: CPT

## 2025-07-28 PROCEDURE — 93000 ELECTROCARDIOGRAM COMPLETE: CPT

## 2025-07-28 PROCEDURE — G2211 COMPLEX E/M VISIT ADD ON: CPT | Mod: NC

## 2025-07-28 RX ORDER — SIMVASTATIN 20 MG/1
20 TABLET, FILM COATED ORAL DAILY
Qty: 90 | Refills: 3 | Status: ACTIVE | COMMUNITY
Start: 2025-07-28

## 2025-07-28 RX ORDER — RAMIPRIL 1.25 MG/1
1.25 CAPSULE ORAL
Qty: 90 | Refills: 3 | Status: ACTIVE | COMMUNITY
Start: 2025-07-28

## 2025-07-28 RX ORDER — SAXAGLIPTIN 2.5 MG/1
2.5 TABLET, FILM COATED ORAL
Refills: 0 | Status: ACTIVE | COMMUNITY
Start: 2025-07-28

## 2025-07-28 RX ORDER — ASPIRIN 81 MG/1
81 TABLET, COATED ORAL
Qty: 90 | Refills: 3 | Status: ACTIVE | COMMUNITY
Start: 2025-07-28

## 2025-07-28 RX ORDER — METFORMIN HYDROCHLORIDE 1000 MG/1
1000 TABLET, COATED ORAL
Refills: 0 | Status: ACTIVE | COMMUNITY
Start: 2025-07-28

## 2025-08-04 ENCOUNTER — NON-APPOINTMENT (OUTPATIENT)
Age: 60
End: 2025-08-04

## 2025-08-06 ENCOUNTER — APPOINTMENT (OUTPATIENT)
Dept: CARDIOLOGY | Facility: CLINIC | Age: 60
End: 2025-08-06
Payer: MEDICAID

## 2025-08-06 PROCEDURE — 93015 CV STRESS TEST SUPVJ I&R: CPT

## 2025-08-06 PROCEDURE — A9500: CPT

## 2025-08-06 PROCEDURE — 78452 HT MUSCLE IMAGE SPECT MULT: CPT

## 2025-08-07 DIAGNOSIS — R94.39 ABNORMAL RESULT OF OTHER CARDIOVASCULAR FUNCTION STUDY: ICD-10-CM

## 2025-08-15 ENCOUNTER — OUTPATIENT (OUTPATIENT)
Dept: OUTPATIENT SERVICES | Facility: HOSPITAL | Age: 60
LOS: 1 days | End: 2025-08-15
Payer: MEDICAID

## 2025-08-15 VITALS
OXYGEN SATURATION: 98 % | HEART RATE: 91 BPM | RESPIRATION RATE: 20 BRPM | HEIGHT: 66 IN | WEIGHT: 263.01 LBS | TEMPERATURE: 98 F | DIASTOLIC BLOOD PRESSURE: 81 MMHG | SYSTOLIC BLOOD PRESSURE: 153 MMHG

## 2025-08-15 VITALS
HEART RATE: 93 BPM | OXYGEN SATURATION: 98 % | TEMPERATURE: 98 F | DIASTOLIC BLOOD PRESSURE: 77 MMHG | SYSTOLIC BLOOD PRESSURE: 144 MMHG | RESPIRATION RATE: 17 BRPM

## 2025-08-15 DIAGNOSIS — R94.39 ABNORMAL RESULT OF OTHER CARDIOVASCULAR FUNCTION STUDY: ICD-10-CM

## 2025-08-15 DIAGNOSIS — Z90.89 ACQUIRED ABSENCE OF OTHER ORGANS: Chronic | ICD-10-CM

## 2025-08-15 DIAGNOSIS — K60.4 RECTAL FISTULA: Chronic | ICD-10-CM

## 2025-08-15 DIAGNOSIS — Z98.890 OTHER SPECIFIED POSTPROCEDURAL STATES: Chronic | ICD-10-CM

## 2025-08-15 LAB
ANION GAP SERPL CALC-SCNC: 4 MMOL/L — LOW (ref 5–17)
BUN SERPL-MCNC: 18 MG/DL — SIGNIFICANT CHANGE UP (ref 7–23)
CALCIUM SERPL-MCNC: 9.1 MG/DL — SIGNIFICANT CHANGE UP (ref 8.5–10.1)
CHLORIDE SERPL-SCNC: 113 MMOL/L — HIGH (ref 96–108)
CO2 SERPL-SCNC: 25 MMOL/L — SIGNIFICANT CHANGE UP (ref 22–31)
CREAT SERPL-MCNC: 0.82 MG/DL — SIGNIFICANT CHANGE UP (ref 0.5–1.3)
EGFR: 101 ML/MIN/1.73M2 — SIGNIFICANT CHANGE UP
EGFR: 101 ML/MIN/1.73M2 — SIGNIFICANT CHANGE UP
GLUCOSE SERPL-MCNC: 186 MG/DL — HIGH (ref 70–99)
HCT VFR BLD CALC: 45.7 % — SIGNIFICANT CHANGE UP (ref 39–50)
HGB BLD-MCNC: 15.2 G/DL — SIGNIFICANT CHANGE UP (ref 13–17)
MCHC RBC-ENTMCNC: 29.7 PG — SIGNIFICANT CHANGE UP (ref 27–34)
MCHC RBC-ENTMCNC: 33.3 G/DL — SIGNIFICANT CHANGE UP (ref 32–36)
MCV RBC AUTO: 89.4 FL — SIGNIFICANT CHANGE UP (ref 80–100)
NRBC # BLD AUTO: 0 K/UL — SIGNIFICANT CHANGE UP (ref 0–0)
NRBC # FLD: 0 K/UL — SIGNIFICANT CHANGE UP (ref 0–0)
NRBC BLD AUTO-RTO: 0 /100 WBCS — SIGNIFICANT CHANGE UP (ref 0–0)
PLATELET # BLD AUTO: 209 K/UL — SIGNIFICANT CHANGE UP (ref 150–400)
PMV BLD: 10.1 FL — SIGNIFICANT CHANGE UP (ref 7–13)
POTASSIUM SERPL-MCNC: 3.7 MMOL/L — SIGNIFICANT CHANGE UP (ref 3.5–5.3)
POTASSIUM SERPL-SCNC: 3.7 MMOL/L — SIGNIFICANT CHANGE UP (ref 3.5–5.3)
RBC # BLD: 5.11 M/UL — SIGNIFICANT CHANGE UP (ref 4.2–5.8)
RBC # FLD: 12.6 % — SIGNIFICANT CHANGE UP (ref 10.3–14.5)
SODIUM SERPL-SCNC: 142 MMOL/L — SIGNIFICANT CHANGE UP (ref 135–145)
WBC # BLD: 8.85 K/UL — SIGNIFICANT CHANGE UP (ref 3.8–10.5)
WBC # FLD AUTO: 8.85 K/UL — SIGNIFICANT CHANGE UP (ref 3.8–10.5)

## 2025-08-15 PROCEDURE — C1887: CPT

## 2025-08-15 PROCEDURE — 99152 MOD SED SAME PHYS/QHP 5/>YRS: CPT | Mod: 25

## 2025-08-15 PROCEDURE — 80048 BASIC METABOLIC PNL TOTAL CA: CPT

## 2025-08-15 PROCEDURE — 82962 GLUCOSE BLOOD TEST: CPT

## 2025-08-15 PROCEDURE — 93005 ELECTROCARDIOGRAM TRACING: CPT

## 2025-08-15 PROCEDURE — 93458 L HRT ARTERY/VENTRICLE ANGIO: CPT | Mod: 26

## 2025-08-15 PROCEDURE — 93458 L HRT ARTERY/VENTRICLE ANGIO: CPT

## 2025-08-15 PROCEDURE — C1894: CPT

## 2025-08-15 PROCEDURE — 93010 ELECTROCARDIOGRAM REPORT: CPT

## 2025-08-15 PROCEDURE — 85027 COMPLETE CBC AUTOMATED: CPT

## 2025-08-15 PROCEDURE — 36415 COLL VENOUS BLD VENIPUNCTURE: CPT

## 2025-08-15 PROCEDURE — C1769: CPT

## 2025-08-15 RX ORDER — EMPAGLIFLOZIN 25 MG/1
1 TABLET, FILM COATED ORAL
Refills: 0 | DISCHARGE

## 2025-08-15 RX ORDER — ASPIRIN 325 MG
1 TABLET ORAL
Refills: 0 | DISCHARGE

## 2025-08-15 RX ADMIN — Medication 500 MILLILITER(S): at 09:11

## 2025-08-15 RX ADMIN — Medication 75 MILLILITER(S): at 09:12

## 2025-09-10 ENCOUNTER — APPOINTMENT (OUTPATIENT)
Dept: CARDIOLOGY | Facility: CLINIC | Age: 60
End: 2025-09-10
Payer: MEDICAID

## 2025-09-10 PROCEDURE — 93306 TTE W/DOPPLER COMPLETE: CPT
